# Patient Record
Sex: FEMALE | Race: WHITE | NOT HISPANIC OR LATINO | Employment: FULL TIME | URBAN - METROPOLITAN AREA
[De-identification: names, ages, dates, MRNs, and addresses within clinical notes are randomized per-mention and may not be internally consistent; named-entity substitution may affect disease eponyms.]

---

## 2017-02-03 ENCOUNTER — OFFICE VISIT (OUTPATIENT)
Dept: URGENT CARE | Facility: URGENT CARE | Age: 62
End: 2017-02-03
Payer: COMMERCIAL

## 2017-02-03 ENCOUNTER — RADIANT APPOINTMENT (OUTPATIENT)
Dept: GENERAL RADIOLOGY | Facility: CLINIC | Age: 62
End: 2017-02-03
Attending: FAMILY MEDICINE
Payer: COMMERCIAL

## 2017-02-03 VITALS
BODY MASS INDEX: 21.63 KG/M2 | RESPIRATION RATE: 16 BRPM | WEIGHT: 130 LBS | HEART RATE: 78 BPM | SYSTOLIC BLOOD PRESSURE: 126 MMHG | DIASTOLIC BLOOD PRESSURE: 84 MMHG | TEMPERATURE: 97.8 F | OXYGEN SATURATION: 98 %

## 2017-02-03 DIAGNOSIS — M25.531 RIGHT WRIST PAIN: Primary | ICD-10-CM

## 2017-02-03 DIAGNOSIS — S69.91XA RIGHT WRIST INJURY: ICD-10-CM

## 2017-02-03 PROCEDURE — 73110 X-RAY EXAM OF WRIST: CPT | Mod: RT

## 2017-02-03 PROCEDURE — 99213 OFFICE O/P EST LOW 20 MIN: CPT | Performed by: FAMILY MEDICINE

## 2017-02-03 RX ORDER — ONDANSETRON 4 MG/1
4-8 TABLET, ORALLY DISINTEGRATING ORAL EVERY 8 HOURS PRN
Qty: 20 TABLET | Refills: 1 | Status: SHIPPED | OUTPATIENT
Start: 2017-02-03 | End: 2017-02-08

## 2017-02-03 NOTE — MR AVS SNAPSHOT
"              After Visit Summary   2/3/2017    Delilah Lopes    MRN: [id number]           Patient Information     Date Of Birth          1955        Visit Information        Provider Department      2/3/2017 8:30 PM Lee Brito MD [facility]        Today's Diagnoses     Right wrist pain    -  1       Follow-ups after your visit        Who to contact     If you have questions or need follow up information about today's clinic visit or your schedule please contact [facility] directly at [phone].  Normal or non-critical lab and imaging results will be communicated to you by [employer], letter or phone within 4 business days after the clinic has received the results. If you do not hear from us within 7 days, please contact the clinic through [place] or phone. If you have a critical or abnormal lab result, we will notify you by phone as soon as possible.  Submit refill requests through [employer] or call your pharmacy and they will forward the refill request to us. Please allow 3 business days for your refill to be completed.          Additional Information About Your Visit        MyChart Information     [employer] lets you send messages to your doctor, view your test results, renew your prescriptions, schedule appointments and more. To sign up, go to [email or web address] . Click on \"Log in\" on the left side of the screen, which will take you to the Welcome page. Then click on \"Sign up Now\" on the right side of the page.     You will be asked to enter the access code listed below, as well as some personal information. Please follow the directions to create your username and password.     Your access code is: [email or web address]  Expires: 5/15/2017  6:59 PM     Your access code will  in 90 days. If you need help or a new code, please call your [place] clinic or [phone].        Care EveryWhere ID     This is your Care EveryWhere ID. This could be used by other " organizations to access your [place] medical records  [id number]        Your Vitals Were     Pulse Temperature Respirations Last Period Pulse Oximetry BMI (Body Mass Index)    78 97.8  F (36.6  C) (Oral) 16 02/12/2006 98% 21.63 kg/m2       Blood Pressure from Last 3 Encounters:   02/08/17 110/70   02/03/17 126/84   12/22/14 112/76    Weight from Last 3 Encounters:   02/08/17 134 lb 8 oz (61 kg)   02/03/17 130 lb (59 kg)   12/22/14 130 lb (59 kg)                 Today's Medication Changes          These changes are accurate as of: 2/3/17 11:59 PM.  If you have any questions, ask your nurse or doctor.               Start taking these medicines.        Dose/Directions    acetaminophen-codeine 300-30 MG per tablet   Commonly known as:  TYLENOL #3   Used for:  Right wrist pain   Started by:  Lee Brito MD        Dose:  1 tablet   Take 1 tablet by mouth every 4 hours as needed for pain maximum 3 tablet(s) per day   Quantity:  15 tablet   Refills:  0       ondansetron 4 MG ODT tab   Commonly known as:  ZOFRAN ODT   Used for:  Right wrist pain   Started by:  Lee Brito MD        Dose:  4-8 mg   Take 1-2 tablets (4-8 mg) by mouth every 8 hours as needed for nausea   Quantity:  20 tablet   Refills:  1            Where to get your medicines      These medications were sent to [facility] [address] [place] [address]  [id number] [place] [id number]    Hours:  24-hours Phone:  [phone]     ondansetron 4 MG ODT tab         Some of these will need a paper prescription and others can be bought over the counter.  Ask your nurse if you have questions.     Bring a paper prescription for each of these medications     acetaminophen-codeine 300-30 MG per tablet                Primary Care Provider Office Phone # Fax #    Donald Garcia [phone]       [facility] [address] [place] [address]         Thank you!     Thank you for choosing [facility]  for your care. Our goal is always to provide you with excellent care. Hearing back from our patients is one way we can continue to improve our services. Please take a few minutes to complete the written survey that you may receive in the mail after your visit with us. Thank you!             Your Updated Medication List - Protect others around you: Learn how to safely use, store and throw away your medicines at www.disposemymeds.org.          This list is accurate as of: 2/3/17 11:59 PM.  Always use your most recent med list.                   Brand Name Dispense Instructions for use    acetaminophen-codeine 300-30 MG per tablet    TYLENOL #3    15 tablet    Take 1 tablet by mouth every 4 hours as needed for pain maximum 3 tablet(s) per day       albuterol 108 (90 BASE) MCG/ACT Inhaler    albuterol    1 Inhaler    Inhale 2 puffs into the lungs every 6 hours as needed for shortness of breath / dyspnea or wheezing       B COMPLEX PO      None Entered       Multi-vitamin Tabs tablet   Generic drug:  multivitamin, therapeutic with minerals      1 TABLET DAILY       ondansetron 4 MG ODT tab    ZOFRAN ODT    20 tablet    Take 1-2 tablets (4-8 mg) by mouth every 8 hours as needed for nausea

## 2017-02-04 NOTE — NURSING NOTE
"Chief Complaint   Patient presents with     Urgent Care     Fall       Initial /84 mmHg  Pulse 78  Temp(Src) 97.8  F (36.6  C) (Oral)  Resp 16  Wt 130 lb (58.968 kg)  SpO2 98%  LMP 02/12/2006 Estimated body mass index is 21.63 kg/(m^2) as calculated from the following:    Height as of 12/22/14: 5' 5\" (1.651 m).    Weight as of this encounter: 130 lb (58.968 kg).  Medication Reconciliation: complete     Deb Tinsley  CMA      "

## 2017-02-04 NOTE — PROGRESS NOTES
SUBJECTIVE:                                                    Delilah Lopes is a 61 year old female who presents to clinic today for the following health issues:      Fell on ice today right wrist fell around 30 minutes.  She thinks she may have broken her wrist.  No nausea or Vomiting.          OBJECTIVE:  Vital signs as noted IMPRESSION: Distal radius fracture, minimally displaced, no definite  intra-articular component. No ulnar fracture. No definite carpal  fracture.above.  Appearance: moderately ill.  Wrist exam: soft tissue tenderness and swelling at the wrist. There does not appear to be angulation but certainly she is unable to move it  Secondary to pain. Distal neurovascular is normal.  X-ray: fracture of Distal radius minimally displaced.    ASSESSMENT:  wrist fracture    PLAN:  NSAID, ice suggested  In addition we did take her to the procedure room. There she was placed in a splint. We also did place her in a sling.    Since there is minimal displacement I think she can certainly wait until Monday to be seen by orthopedics however  She can be seen tomorrow at the orthopedic urgent care.    Current Outpatient Prescriptions   Medication Sig Dispense Refill     acetaminophen-codeine (TYLENOL #3) 300-30 MG per tablet Take 1 tablet by mouth every 4 hours as needed for pain maximum 3 tablet(s) per day 15 tablet 0     albuterol (ALBUTEROL) 108 (90 BASE) MCG/ACT inhaler Inhale 2 puffs into the lungs every 6 hours as needed for shortness of breath / dyspnea or wheezing 1 Inhaler 1     MULTI-VITAMIN OR TABS 1 TABLET DAILY       B COMPLEX OR None Entered       multivitamin, therapeutic with minerals (MULTI-VITAMIN) TABS tablet        Ascorbic Acid (VITAMIN C) 100 MG TABS        acetaminophen-codeine (TYLENOL #3) 300-30 MG per tablet        hydrochlorothiazide (HYDRODIURIL) 25 MG tablet Take 25 mg by mouth       ASMANEX 30 METERED DOSES 110 MCG/INH inhaler INHALE ONE PUFF DAILY FOR ASTHMA, USE WHEN OUT OF  TOWN AND NEBULIZER NOT AVAILABLE  3       See orders in [facility].

## 2017-02-08 ENCOUNTER — OFFICE VISIT (OUTPATIENT)
Dept: FAMILY MEDICINE | Facility: CLINIC | Age: 62
End: 2017-02-08
Payer: COMMERCIAL

## 2017-02-08 VITALS
HEART RATE: 84 BPM | HEIGHT: 65 IN | TEMPERATURE: 97.8 F | SYSTOLIC BLOOD PRESSURE: 110 MMHG | RESPIRATION RATE: 14 BRPM | OXYGEN SATURATION: 97 % | DIASTOLIC BLOOD PRESSURE: 70 MMHG | WEIGHT: 134.5 LBS | BODY MASS INDEX: 22.41 KG/M2

## 2017-02-08 DIAGNOSIS — Z01.818 PREOP GENERAL PHYSICAL EXAM: Primary | ICD-10-CM

## 2017-02-08 LAB
ALBUMIN SERPL-MCNC: 4.1 G/DL (ref 3.4–5)
ALP SERPL-CCNC: 71 U/L (ref 40–150)
ALT SERPL W P-5'-P-CCNC: 20 U/L (ref 0–50)
ANION GAP SERPL CALCULATED.3IONS-SCNC: 8 MMOL/L (ref 3–14)
AST SERPL W P-5'-P-CCNC: 10 U/L (ref 0–45)
BILIRUB SERPL-MCNC: 1.1 MG/DL (ref 0.2–1.3)
BUN SERPL-MCNC: 12 MG/DL (ref 7–30)
CALCIUM SERPL-MCNC: 9.3 MG/DL (ref 8.5–10.1)
CHLORIDE SERPL-SCNC: 98 MMOL/L (ref 94–109)
CO2 SERPL-SCNC: 31 MMOL/L (ref 20–32)
CREAT SERPL-MCNC: 0.66 MG/DL (ref 0.52–1.04)
ERYTHROCYTE [DISTWIDTH] IN BLOOD BY AUTOMATED COUNT: 12 % (ref 10–15)
GFR SERPL CREATININE-BSD FRML MDRD: ABNORMAL ML/MIN/1.7M2
GLUCOSE SERPL-MCNC: 91 MG/DL (ref 70–99)
HCT VFR BLD AUTO: 39 % (ref 35–47)
HGB BLD-MCNC: 13.1 G/DL (ref 11.7–15.7)
MCH RBC QN AUTO: 31.4 PG (ref 26.5–33)
MCHC RBC AUTO-ENTMCNC: 33.6 G/DL (ref 31.5–36.5)
MCV RBC AUTO: 94 FL (ref 78–100)
PLATELET # BLD AUTO: 313 10E9/L (ref 150–450)
POTASSIUM SERPL-SCNC: 3.2 MMOL/L (ref 3.4–5.3)
PROT SERPL-MCNC: 7.6 G/DL (ref 6.8–8.8)
RBC # BLD AUTO: 4.17 10E12/L (ref 3.8–5.2)
SODIUM SERPL-SCNC: 137 MMOL/L (ref 133–144)
WBC # BLD AUTO: 6.2 10E9/L (ref 4–11)

## 2017-02-08 PROCEDURE — 80053 COMPREHEN METABOLIC PANEL: CPT | Performed by: NURSE PRACTITIONER

## 2017-02-08 PROCEDURE — 99214 OFFICE O/P EST MOD 30 MIN: CPT | Mod: 25 | Performed by: NURSE PRACTITIONER

## 2017-02-08 PROCEDURE — 85027 COMPLETE CBC AUTOMATED: CPT | Performed by: NURSE PRACTITIONER

## 2017-02-08 PROCEDURE — 36415 COLL VENOUS BLD VENIPUNCTURE: CPT | Performed by: NURSE PRACTITIONER

## 2017-02-08 RX ORDER — MULTIPLE VITAMINS W/ MINERALS TAB 9MG-400MCG
TAB ORAL
COMMUNITY
End: 2017-07-09

## 2017-02-08 RX ORDER — RIBOFLAVIN (VITAMIN B2) 100 MG
1500 TABLET ORAL AT BEDTIME
COMMUNITY
Start: 2007-06-19

## 2017-02-08 RX ORDER — HYDROCHLOROTHIAZIDE 25 MG/1
25 TABLET ORAL DAILY
COMMUNITY
Start: 2016-05-05 | End: 2017-10-03 | Stop reason: SINTOL

## 2017-02-08 NOTE — PROGRESS NOTES
[facility]  [address] [place] [address]  [phone]  Dept: [phone]    PRE-OP EVALUATION:  Today's date: 2017    Delilah Lopes (: 1955) presents for pre-operative evaluation assessment as requested by Dr. Evelin Andrade.  She requires evaluation and anesthesia risk assessment prior to undergoing surgery/procedure for treatment of Radial distal fracture  .  Proposed procedure: fracture    Date of Surgery/ Procedure: 2017  Time of Surgery/ Procedure: 12  Hospital/Surgical Facility: [facility]  Fax number for surgical facility: [phone]  Primary Physician: Donald Garcia  Type of Anesthesia Anticipated: General    Patient has a Health Care Directive or Living Will:  YES we don't have a copy    1. NO - Do you have a history of heart attack, stroke, stent, bypass or surgery on an artery in the head, neck, heart or legs?  2. NO - Do you ever have any pain or discomfort in your chest?  3. NO - Do you have a history of  Heart Failure?  4. NO - Are you troubled by shortness of breath when: walking on the level, up a slight hill or at night?  5. NO - Do you currently have a cold, bronchitis or other respiratory infection?  6. NO - Do you have a cough, shortness of breath or wheezing?  7. NO - Do you sometimes get pains in the calves of your legs when you walk?  8. YES - Do you or anyone in your family have previous history of blood clots?  9. NO - Do you or does anyone in your family have a serious bleeding problem such as prolonged bleeding following surgeries or cuts?  10. NO - Have you ever had problems with anemia or been told to take iron pills?  11. NO - Have you had any abnormal blood loss such as black, tarry or bloody stools, or abnormal vaginal bleeding?  12. NO - Have you ever had a blood transfusion?  13. YES - Have you or any of your relatives ever had problems with anesthesia? Postop nausea  14. NO - Do you have sleep apnea, excessive snoring or  daytime drowsiness?  15. NO - Do you have any prosthetic heart valves?  16. NO - Do you have prosthetic joints?  17. NO - Is there any chance that you may be pregnant?      HPI:                                                      Brief HPI related to upcoming procedure:  Patient fell on the icy pavement and has a right distal radial fracture that needs to be surgically repaired.           MEDICAL HISTORY:                                                      Patient Active Problem List    Diagnosis Date Noted     Essential hypertension 2015     Priority: Medium     CARDIOVASCULAR SCREENING; LDL GOAL LESS THAN 160 10/31/2010     Cold sore 2009     Mild persistent asthma 2006     Stricture and stenosis of esophagus 2002     Urethral stricture 2002     Problem list name updated by automated process. Provider to review        Past Medical History   Diagnosis Date     Stricture and stenosis of esophagus      Dx , [place] [facility]     Urethral stricture unspecified      Mild persistent asthma      HTN (hypertension)      Essential hypertension 2015     Past Surgical History   Procedure Laterality Date     C nonspecific procedure        x3 ,,     Hysterectomy, pap no longer indicated       Stent       kidney     Hernia repair       2 hernia repairs     Current Outpatient Prescriptions   Medication Sig Dispense Refill     acetaminophen-codeine (TYLENOL #3) 300-30 MG per tablet Take 1 tablet by mouth every 4 hours as needed for pain maximum 3 tablet(s) per day 15 tablet 0     ondansetron (ZOFRAN ODT) 4 MG ODT tab Take 1-2 tablets (4-8 mg) by mouth every 8 hours as needed for nausea 20 tablet 1     albuterol (ALBUTEROL) 108 (90 BASE) MCG/ACT inhaler Inhale 2 puffs into the lungs every 6 hours as needed for shortness of breath / dyspnea or wheezing 1 Inhaler 1     azithromycin (ZITHROMAX) 250 MG tablet Two tablets first day, then one tablet daily for four days. 6  tablet 0     atenolol (TENORMIN) 50 MG tablet Take 1 tablet (50 mg) by mouth daily 90 tablet 4     MULTI-VITAMIN OR TABS 1 TABLET DAILY       B COMPLEX OR None Entered       OTC products: None, except as noted above    Allergies   Allergen Reactions     Cat Hair [Cats]      Sulfa Drugs Rash     Ultram [Tramadol Hcl]      Headache  Weirdness  And sick nasuea, vomiting      Latex Allergy: NO    Social History   Substance Use Topics     Smoking status: Never Smoker      Smokeless tobacco: Never Used     Alcohol Use: Yes      Comment: 2-3 times a week     History   Drug Use No       REVIEW OF SYSTEMS:                                                    Constitutional, HEENT, cardiovascular, pulmonary, gi and gu systems are negative, except as otherwise noted.    EXAM:                                                    [facility] 02/12/2006    GENERAL APPEARANCE: healthy, alert and no distress     EYES: EOMI,- PERRL, slight irritation noted of conjunctiva     HENT: ear canals and TM's normal and nose and mouth without ulcers or lesions     NECK: no adenopathy, no asymmetry, masses, or scars and thyroid normal to palpation     RESP: lungs clear to auscultation - no rales, rhonchi or wheezes     CV: regular rates and rhythm, normal S1 S2, no S3 or S4 and no murmur, click or rub -     ABDOMEN:  soft, nontender, no HSM or masses and bowel sounds normal     MS: right forearm in a soft splint with good CMS. FROM in all extremities.     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 and affect normal/bright     LYMPHATICS: No axillary, cervical, inguinal, or supraclavicular nodes    DIAGNOSTICS:                                                    Coagulation Studies (e.g. INR, PTT, platelet count)    Recent Labs   Lab Test  12/19/14 0828 02/25/14   NA  140   --     POTASSIUM  4.7  4.7   CR  0.83  0.8        IMPRESSION:                                                    Diagnosis/reason for consult: right distal radius fracture.     The proposed surgical procedure is considered INTERMEDIATE risk.    REVISED CARDIAC RISK INDEX  The patient has the following serious cardiovascular risks for perioperative complications such as (MI, PE, VFib and 3  AV Block):  No serious cardiac risks  INTERPRETATION: 1 risks: Class II (low risk - 0.9% complication rate)    The patient has the following additional risks for perioperative complications:  No identified additional risks        RECOMMENDATIONS:                                                          --Patient is to take all scheduled medications on the day of surgery EXCEPT for modifications listed below.    APPROVAL GIVEN to proceed with proposed procedure, without further diagnostic evaluation       Signed Electronically by: Nelly Hamilton NP    Copy of this evaluation report is provided to requesting physician.    [facility] Preop Guidelines

## 2017-02-08 NOTE — NURSING NOTE
"Chief Complaint   Patient presents with     Pre-Op Exam       Initial /70 mmHg  Pulse 84  Temp(Src) 97.8  F (36.6  C) (Oral)  Resp 14  Ht 5' 5\" (1.651 m)  Wt 134 lb 8 oz (61.009 kg)  BMI 22.38 kg/m2  SpO2 97%  LMP 02/12/2006  Breastfeeding? No Estimated body mass index is 22.38 kg/(m^2) as calculated from the following:    Height as of this encounter: 5' 5\" (1.651 m).    Weight as of this encounter: 134 lb 8 oz (61.009 kg).  Medication Reconciliation: unable or not appropriate to perform   PERLITA Nayak      "

## 2017-02-09 ASSESSMENT — ASTHMA QUESTIONNAIRES: ACT_TOTALSCORE: 24

## 2017-05-23 ENCOUNTER — TELEPHONE (OUTPATIENT)
Dept: FAMILY MEDICINE | Facility: CLINIC | Age: 62
End: 2017-05-23

## 2017-05-23 NOTE — LETTER
[facility]          [address] [place] [address] [phone]      Delilah Lopes  [address]  [facility] [address]      Dear Delilah,    Our records indicate that you may be due for preventative health care services.  Please make an appointment or call to set up the following tests as recommended.  If you have already had this testing done at another clinic please contact us to help us update our records to reflect the preventative care that you have had.    Breast cancer screen (mammogram) - Recommended every 1-2 years for women age 50 and older. Mammograms help detect breast cancer, which is the most common cancer among women in the [place]. You may need to start having mammograms earlier and more often if you have had breast cancer, breast problems, or have a family history of breast cancer.                                                                                                                                    Thank you for choosing [facility]. We appreciate the opportunity to serve you and look forward to supporting your healthcare needs in the future.    If you have any questions or concerns, please contact us at [phone]      Sincerely,      [facility] Staff

## 2017-05-23 NOTE — TELEPHONE ENCOUNTER
Panel Management Review      Patient has the following on her problem list:     Asthma review     ACT Total Scores 2/8/2017   ACT TOTAL SCORE (Goal Greater than or Equal to 20) 24   In the past 12 months, how many times did you visit the emergency room for your asthma without being admitted to the hospital? 0   In the past 12 months, how many times were you hospitalized overnight because of your asthma? 0      1. Is Asthma diagnosis on the Problem List? Yes    2. Is Asthma listed on Health Maintenance? Yes    3. Patient is due for:  AAP      Composite cancer screening  Chart review shows that this patient is due/due soon for the following Mammogram  Summary:    Patient is due/failing the following:   MAMMOGRAM    Action needed:   Patient needs referral/order: mammogram     Type of outreach:    Phone, left message for patient to call back.     Questions for provider review:    None                                                                                                                                    PERLITA Nayak       Chart routed to  .

## 2017-07-09 ENCOUNTER — APPOINTMENT (OUTPATIENT)
Dept: GENERAL RADIOLOGY | Facility: CLINIC | Age: 62
End: 2017-07-09
Attending: EMERGENCY MEDICINE
Payer: COMMERCIAL

## 2017-07-09 ENCOUNTER — HOSPITAL ENCOUNTER (OUTPATIENT)
Facility: CLINIC | Age: 62
Setting detail: OBSERVATION
Discharge: HOME | End: 2017-07-10
Attending: EMERGENCY MEDICINE | Admitting: INTERNAL MEDICINE
Payer: COMMERCIAL

## 2017-07-09 DIAGNOSIS — R07.9 ACUTE CHEST PAIN: ICD-10-CM

## 2017-07-09 LAB
ALBUMIN SERPL-MCNC: 4 G/DL (ref 3.4–5)
ALP SERPL-CCNC: 104 U/L (ref 40–150)
ALT SERPL W P-5'-P-CCNC: 26 U/L (ref 0–50)
ANION GAP SERPL CALCULATED.3IONS-SCNC: 7 MMOL/L (ref 3–14)
AST SERPL W P-5'-P-CCNC: 21 U/L (ref 0–45)
BASOPHILS # BLD AUTO: 0.1 10E9/L (ref 0–0.2)
BASOPHILS NFR BLD AUTO: 0.8 %
BILIRUB SERPL-MCNC: 0.4 MG/DL (ref 0.2–1.3)
BUN SERPL-MCNC: 11 MG/DL (ref 7–30)
CALCIUM SERPL-MCNC: 8.8 MG/DL (ref 8.5–10.1)
CHLORIDE SERPL-SCNC: 104 MMOL/L (ref 94–109)
CO2 SERPL-SCNC: 30 MMOL/L (ref 20–32)
CREAT SERPL-MCNC: 0.71 MG/DL (ref 0.52–1.04)
DIFFERENTIAL METHOD BLD: NORMAL
EOSINOPHIL # BLD AUTO: 0.5 10E9/L (ref 0–0.7)
EOSINOPHIL NFR BLD AUTO: 5.1 %
ERYTHROCYTE [DISTWIDTH] IN BLOOD BY AUTOMATED COUNT: 11.9 % (ref 10–15)
GFR SERPL CREATININE-BSD FRML MDRD: 84 ML/MIN/1.7M2
GLUCOSE SERPL-MCNC: 95 MG/DL (ref 70–99)
HCT VFR BLD AUTO: 37.9 % (ref 35–47)
HGB BLD-MCNC: 12.8 G/DL (ref 11.7–15.7)
IMM GRANULOCYTES # BLD: 0 10E9/L (ref 0–0.4)
IMM GRANULOCYTES NFR BLD: 0.1 %
LYMPHOCYTES # BLD AUTO: 2.6 10E9/L (ref 0.8–5.3)
LYMPHOCYTES NFR BLD AUTO: 26.4 %
MCH RBC QN AUTO: 31.4 PG (ref 26.5–33)
MCHC RBC AUTO-ENTMCNC: 33.8 G/DL (ref 31.5–36.5)
MCV RBC AUTO: 93 FL (ref 78–100)
MONOCYTES # BLD AUTO: 0.8 10E9/L (ref 0–1.3)
MONOCYTES NFR BLD AUTO: 7.9 %
NEUTROPHILS # BLD AUTO: 5.9 10E9/L (ref 1.6–8.3)
NEUTROPHILS NFR BLD AUTO: 59.7 %
NRBC # BLD AUTO: 0 10*3/UL
NRBC BLD AUTO-RTO: 0 /100
PLATELET # BLD AUTO: 312 10E9/L (ref 150–450)
POTASSIUM SERPL-SCNC: 3.1 MMOL/L (ref 3.4–5.3)
PROT SERPL-MCNC: 7.6 G/DL (ref 6.8–8.8)
RBC # BLD AUTO: 4.07 10E12/L (ref 3.8–5.2)
SODIUM SERPL-SCNC: 141 MMOL/L (ref 133–144)
TROPONIN I BLD-MCNC: 0 UG/L (ref 0–0.1)
TROPONIN I SERPL-MCNC: NORMAL UG/L (ref 0–0.04)
WBC # BLD AUTO: 9.9 10E9/L (ref 4–11)

## 2017-07-09 PROCEDURE — 25000132 ZZH RX MED GY IP 250 OP 250 PS 637: Performed by: EMERGENCY MEDICINE

## 2017-07-09 PROCEDURE — 85025 COMPLETE CBC W/AUTO DIFF WBC: CPT | Performed by: EMERGENCY MEDICINE

## 2017-07-09 PROCEDURE — G0378 HOSPITAL OBSERVATION PER HR: HCPCS

## 2017-07-09 PROCEDURE — 99285 EMERGENCY DEPT VISIT HI MDM: CPT | Mod: 25

## 2017-07-09 PROCEDURE — 84484 ASSAY OF TROPONIN QUANT: CPT

## 2017-07-09 PROCEDURE — 84484 ASSAY OF TROPONIN QUANT: CPT | Performed by: EMERGENCY MEDICINE

## 2017-07-09 PROCEDURE — 71020 XR CHEST 2 VW: CPT

## 2017-07-09 PROCEDURE — 93005 ELECTROCARDIOGRAM TRACING: CPT

## 2017-07-09 PROCEDURE — 99220 ZZC INITIAL OBSERVATION CARE,LEVL III: CPT | Performed by: INTERNAL MEDICINE

## 2017-07-09 PROCEDURE — 80053 COMPREHEN METABOLIC PANEL: CPT | Performed by: EMERGENCY MEDICINE

## 2017-07-09 RX ORDER — POTASSIUM CHLORIDE 1.5 G/1.58G
40 POWDER, FOR SOLUTION ORAL ONCE
Status: COMPLETED | OUTPATIENT
Start: 2017-07-09 | End: 2017-07-10

## 2017-07-09 RX ORDER — NITROGLYCERIN 0.4 MG/1
0.4 TABLET SUBLINGUAL EVERY 5 MIN PRN
Status: DISCONTINUED | OUTPATIENT
Start: 2017-07-09 | End: 2017-07-10

## 2017-07-09 RX ORDER — ASPIRIN 325 MG
325 TABLET ORAL ONCE
Status: COMPLETED | OUTPATIENT
Start: 2017-07-09 | End: 2017-07-09

## 2017-07-09 RX ADMIN — ASPIRIN 325 MG ORAL TABLET 325 MG: 325 PILL ORAL at 21:51

## 2017-07-09 RX ADMIN — NITROGLYCERIN 0.4 MG: 0.4 TABLET SUBLINGUAL at 21:51

## 2017-07-09 ASSESSMENT — ENCOUNTER SYMPTOMS
VOMITING: 0
NAUSEA: 0
DIAPHORESIS: 0

## 2017-07-09 NOTE — IP AVS SNAPSHOT
[facility] Observation Department    [address]    [facility] [address]    Phone:  [phone]                                       After Visit Summary   7/9/2017    Delilah Lopes    MRN: [id number]           After Visit Summary Signature Page     I have received my discharge instructions, and my questions have been answered. I have discussed any challenges I see with this plan with the nurse or doctor.    ..........................................................................................................................................  Patient/Patient Representative Signature      ..........................................................................................................................................  Patient Representative Print Name and Relationship to Patient    ..................................................               ................................................  Date                                            Time    ..........................................................................................................................................  Reviewed by Signature/Title    ...................................................              ..............................................  Date                                                            Time

## 2017-07-09 NOTE — IP AVS SNAPSHOT
MRN:[id number]                      After Visit Summary   7/9/2017    Delilah Lopes    MRN: [id number]           Thank you!     Thank you for choosing [facility] for your care. Our goal is always to provide you with excellent care. Hearing back from our patients is one way we can continue to improve our services. Please take a few minutes to complete the written survey that you may receive in the mail after you visit. If you would like to speak to someone directly about your visit please contact Patient Relations at [phone]. Thank you!          Patient Information     Date Of Birth          1955        About your hospital stay     You were admitted on:  July 9, 2017 You last received care in the:  [facility] Observation Department    You were discharged on:  July 10, 2017        Reason for your hospital stay       You were admitted due to concerns of chest pain. Your workup included monitoring on telemetry, serial troponins which were negative, and exercise stress test that was normal.                  Who to Call     For medical emergencies, please call 911.  For non-urgent questions about your medical care, please call your primary care provider or clinic, [phone]          Attending Provider     Provider Specialty    Alber Samaniego MD Emergency Medicine    Vipin Wynne MD Internal Medicine       Primary Care Provider Office Phone #    [facility] [phone]      After Care Instructions     Activity       Your activity upon discharge: activity as tolerated            Diet       Follow this diet upon discharge: Regular diet            Discharge Instructions       1. Keep daily log of blood pressures to bring to follow up appointment with primary care to discuss possible changes to current medication regimen  2. Monitor recurrence of symptoms with certain foods or activity (consider acid reflux as cause to pain prior to  "admission) and discuss with primary care                  Follow-up Appointments     Follow-up and recommended labs and tests        Follow up with primary care provider, [facility], within 7 days for hospital follow- up and discuss blood pressure management.  No follow up labs or test are needed.                             Pending Results     No orders found for last 3 day(s).            Statement of Approval     Ordered          07/10/17 1101  I have reviewed and agree with all the recommendations and orders detailed in this document.  EFFECTIVE NOW     Approved and electronically signed by:  hCerie Duffy PA-C             Admission Information     Date & Time Provider Department Dept. Phone    2017 Vipin Wynne MD [facility] Observation Department [phone]      Your Vitals Were     Blood Pressure Pulse Temperature Respirations Height Weight    138/84 (BP Location: Right arm) 74 97  F (36.1  C) (Oral) 16 1.651 m (5' 5\") 62 kg (136 lb 9.6 oz)    Last Period Pulse Oximetry BMI (Body Mass Index)             2006 95% 22.73 kg/m2         [employer] Information     [employer] lets you send messages to your doctor, view your test results, renew your prescriptions, schedule appointments and more. To sign up, go to [email or web address] . Click on \"Log in\" on the left side of the screen, which will take you to the Welcome page. Then click on \"Sign up Now\" on the right side of the page.     You will be asked to enter the access code listed below, as well as some personal information. Please follow the directions to create your username and password.     Your access code is: [email or web address]  Expires: 10/8/2017 11:07 AM     Your access code will  in 90 days. If you need help or a new code, please call your [facility] clinic or [phone].        Care EveryWhere ID     This is your Care EveryWhere ID. This could be used by other organizations to access your [facility] " medical records  [id number]        Equal Access to Services     RAF SHAYE : Hadii aad ku hadrayamisael Gardner, wasohada lujaya, qatonyta shakirakaitlynnnadia smith, kellie marcusmaximusbeltran murray. So [facility] [phone].    ATENCIÓN: Si habla español, tiene a chakraborty disposición servicios gratuitos de asistencia lingüística. Llame al [phone].    We comply with applicable federal civil rights laws and [place] laws. We do not discriminate on the basis of race, color, national origin, age, disability sex, sexual orientation or gender identity.               Review of your medicines      CONTINUE these medicines which have NOT CHANGED        Dose / Directions    albuterol 108 (90 BASE) MCG/ACT Inhaler   Commonly known as:  albuterol   Used for:  Mild persistent asthma        Dose:  2 puff   Inhale 2 puffs into the lungs every 6 hours as needed for shortness of breath / dyspnea or wheezing   Quantity:  1 Inhaler   Refills:  1       ASMANEX 30 METERED DOSES 110 MCG/INH inhaler   Generic drug:  mometasone        INHALE ONE PUFF DAILY FOR ASTHMA, USE WHEN OUT OF TOWN AND NEBULIZER NOT AVAILABLE   Refills:  3       B COMPLEX PO        1 tablet orally at bedtime   Refills:  0       FISH OIL PO        Dose:  2 capsule   Take 2 capsules by mouth At Bedtime   Refills:  0       hydrochlorothiazide 25 MG tablet   Commonly known as:  HYDRODIURIL        Dose:  25 mg   Take 25 mg by mouth daily   Refills:  0       Multi-vitamin Tabs tablet   Generic drug:  multivitamin, therapeutic with minerals        1 TABLET DAILY at bedtime   Refills:  0       Vitamin C 100 MG Tabs        Dose:  1500 mg   Take 1,500 mg by mouth At Bedtime   Refills:  0       VITAMIN D (CHOLECALCIFEROL) PO        Dose:  2500 Units   Take 2,500 Units by mouth At Bedtime   Refills:  0                Protect others around you: Learn how to safely use, store and throw away your medicines at www.disposemymeds.org.             Medication List: This is a list of all  your medications and when to take them. Check marks below indicate your daily home schedule. Keep this list as a reference.      Medications           Morning Afternoon Evening Bedtime As Needed    albuterol 108 (90 BASE) MCG/ACT Inhaler   Commonly known as:  albuterol   Inhale 2 puffs into the lungs every 6 hours as needed for shortness of breath / dyspnea or wheezing                                ASMANEX 30 METERED DOSES 110 MCG/INH inhaler   INHALE ONE PUFF DAILY FOR ASTHMA, USE WHEN OUT OF TOWN AND NEBULIZER NOT AVAILABLE   Generic drug:  mometasone                                B COMPLEX PO   1 tablet orally at bedtime                                FISH OIL PO   Take 2 capsules by mouth At Bedtime                                hydrochlorothiazide 25 MG tablet   Commonly known as:  HYDRODIURIL   Take 25 mg by mouth daily   Last time this was given:  25 mg on 7/10/2017  8:35 AM                                Multi-vitamin Tabs tablet   1 TABLET DAILY at bedtime   Generic drug:  multivitamin, therapeutic with minerals                                Vitamin C 100 MG Tabs   Take 1,500 mg by mouth At Bedtime                                VITAMIN D (CHOLECALCIFEROL) PO   Take 2,500 Units by mouth At Bedtime

## 2017-07-10 ENCOUNTER — APPOINTMENT (OUTPATIENT)
Dept: CARDIOLOGY | Facility: CLINIC | Age: 62
End: 2017-07-10
Attending: INTERNAL MEDICINE
Payer: COMMERCIAL

## 2017-07-10 VITALS
SYSTOLIC BLOOD PRESSURE: 138 MMHG | WEIGHT: 136.6 LBS | OXYGEN SATURATION: 95 % | BODY MASS INDEX: 22.76 KG/M2 | HEIGHT: 65 IN | DIASTOLIC BLOOD PRESSURE: 84 MMHG | HEART RATE: 74 BPM | RESPIRATION RATE: 16 BRPM | TEMPERATURE: 97 F

## 2017-07-10 PROBLEM — R07.9 CHEST PAIN: Status: RESOLVED | Noted: 2017-07-10 | Resolved: 2017-07-10

## 2017-07-10 PROBLEM — R07.9 CHEST PAIN: Status: ACTIVE | Noted: 2017-07-10

## 2017-07-10 LAB
INTERPRETATION ECG - MUSE: NORMAL
POTASSIUM SERPL-SCNC: 3.7 MMOL/L (ref 3.4–5.3)
TROPONIN I SERPL-MCNC: NORMAL UG/L (ref 0–0.04)
TROPONIN I SERPL-MCNC: NORMAL UG/L (ref 0–0.04)

## 2017-07-10 PROCEDURE — 96374 THER/PROPH/DIAG INJ IV PUSH: CPT | Mod: 59

## 2017-07-10 PROCEDURE — 93325 DOPPLER ECHO COLOR FLOW MAPG: CPT | Mod: TC

## 2017-07-10 PROCEDURE — 25000132 ZZH RX MED GY IP 250 OP 250 PS 637: Performed by: PHYSICIAN ASSISTANT

## 2017-07-10 PROCEDURE — 99217 ZZC OBSERVATION CARE DISCHARGE: CPT | Performed by: PHYSICIAN ASSISTANT

## 2017-07-10 PROCEDURE — 84484 ASSAY OF TROPONIN QUANT: CPT | Performed by: INTERNAL MEDICINE

## 2017-07-10 PROCEDURE — 93350 STRESS TTE ONLY: CPT | Mod: 26 | Performed by: INTERNAL MEDICINE

## 2017-07-10 PROCEDURE — 25000132 ZZH RX MED GY IP 250 OP 250 PS 637: Performed by: EMERGENCY MEDICINE

## 2017-07-10 PROCEDURE — S0028 INJECTION, FAMOTIDINE, 20 MG: HCPCS | Performed by: INTERNAL MEDICINE

## 2017-07-10 PROCEDURE — 93016 CV STRESS TEST SUPVJ ONLY: CPT | Performed by: INTERNAL MEDICINE

## 2017-07-10 PROCEDURE — G0378 HOSPITAL OBSERVATION PER HR: HCPCS

## 2017-07-10 PROCEDURE — 36415 COLL VENOUS BLD VENIPUNCTURE: CPT | Performed by: INTERNAL MEDICINE

## 2017-07-10 PROCEDURE — 93325 DOPPLER ECHO COLOR FLOW MAPG: CPT | Mod: 26 | Performed by: INTERNAL MEDICINE

## 2017-07-10 PROCEDURE — 25000125 ZZHC RX 250: Performed by: INTERNAL MEDICINE

## 2017-07-10 PROCEDURE — 84132 ASSAY OF SERUM POTASSIUM: CPT | Performed by: INTERNAL MEDICINE

## 2017-07-10 PROCEDURE — 25000132 ZZH RX MED GY IP 250 OP 250 PS 637: Performed by: INTERNAL MEDICINE

## 2017-07-10 PROCEDURE — 93321 DOPPLER ECHO F-UP/LMTD STD: CPT | Mod: 26 | Performed by: INTERNAL MEDICINE

## 2017-07-10 PROCEDURE — 93018 CV STRESS TEST I&R ONLY: CPT | Performed by: INTERNAL MEDICINE

## 2017-07-10 RX ORDER — ONDANSETRON 4 MG/1
4 TABLET, ORALLY DISINTEGRATING ORAL EVERY 6 HOURS PRN
Status: DISCONTINUED | OUTPATIENT
Start: 2017-07-10 | End: 2017-07-10 | Stop reason: HOSPADM

## 2017-07-10 RX ORDER — NITROGLYCERIN 0.4 MG/1
0.4 TABLET SUBLINGUAL EVERY 5 MIN PRN
Status: DISCONTINUED | OUTPATIENT
Start: 2017-07-10 | End: 2017-07-10 | Stop reason: HOSPADM

## 2017-07-10 RX ORDER — ASPIRIN 81 MG/1
81 TABLET ORAL DAILY
Status: DISCONTINUED | OUTPATIENT
Start: 2017-07-10 | End: 2017-07-10 | Stop reason: HOSPADM

## 2017-07-10 RX ORDER — ACETAMINOPHEN 325 MG/1
650 TABLET ORAL EVERY 4 HOURS PRN
Status: DISCONTINUED | OUTPATIENT
Start: 2017-07-10 | End: 2017-07-10 | Stop reason: HOSPADM

## 2017-07-10 RX ORDER — HYDROCHLOROTHIAZIDE 25 MG/1
25 TABLET ORAL DAILY
Status: DISCONTINUED | OUTPATIENT
Start: 2017-07-10 | End: 2017-07-10 | Stop reason: HOSPADM

## 2017-07-10 RX ORDER — NALOXONE HYDROCHLORIDE 0.4 MG/ML
.1-.4 INJECTION, SOLUTION INTRAMUSCULAR; INTRAVENOUS; SUBCUTANEOUS
Status: DISCONTINUED | OUTPATIENT
Start: 2017-07-10 | End: 2017-07-10 | Stop reason: HOSPADM

## 2017-07-10 RX ORDER — ONDANSETRON 2 MG/ML
4 INJECTION INTRAMUSCULAR; INTRAVENOUS EVERY 6 HOURS PRN
Status: DISCONTINUED | OUTPATIENT
Start: 2017-07-10 | End: 2017-07-10 | Stop reason: HOSPADM

## 2017-07-10 RX ADMIN — POTASSIUM CHLORIDE 40 MEQ: 1.5 POWDER, FOR SOLUTION ORAL at 00:41

## 2017-07-10 RX ADMIN — ASPIRIN 81 MG: 81 TABLET, COATED ORAL at 08:35

## 2017-07-10 RX ADMIN — FAMOTIDINE 20 MG: 10 INJECTION, SOLUTION INTRAVENOUS at 00:42

## 2017-07-10 RX ADMIN — HYDROCHLOROTHIAZIDE 25 MG: 25 TABLET ORAL at 08:35

## 2017-07-10 NOTE — ED NOTES
Pain in left arm for several hours and then developed chest pain in the past hour.  Pt did report some shortness of breath over the past day but has hx.  Of asthma.

## 2017-07-10 NOTE — PLAN OF CARE
Problem: Discharge Planning  Goal: Discharge Planning (Adult, OB, Behavioral, Peds)  OBSERVATION patient END time: 1119

## 2017-07-10 NOTE — PLAN OF CARE
Problem: Discharge Planning  Goal: Discharge Planning (Adult, OB, Behavioral, Peds)  Outcome: Improving  PRIMARY DIAGNOSIS: CHEST PAIN  OUTPATIENT/OBSERVATION GOALS TO BE MET BEFORE DISCHARGE:  1. Ruled out acute coronary syndrome (negative or stable Troponin):  Neg x 1, next to be drawn at 0200 and 0600  2. Pain Status: Pain free.  3. Appropriate provocative testing performed: Yes  - Stress Test Procedure: Regular, scheduled for tomorrow  - Interpretation of cardiac rhythm per telemetry tech: SR 82     4. Cleared by Consultants (if applicable):No  5. Return to near baseline physical activity: Yes  Discharge Planner Nurse   Safe discharge environment identified: Yes  Barriers to discharge: Yes, exercise stress test in am, trop x 2       Entered by: Juarez Alves 07/10/2017 2:01 AM     Please review provider order for any additional goals.   Nurse to notify provider when observation goals have been met and patient is ready for discharge.  Pt A&O x4, VSS. Pt denies chest/back pain, SOB, dizziness. Pt on clear liquid diet. Pt received 40 mEq of oral potassium, K was originally 3.1 in ED. Pt was oriented to unit and room. Will continue to monitor.

## 2017-07-10 NOTE — PLAN OF CARE
Problem: Discharge Planning  Goal: Discharge Planning (Adult, OB, Behavioral, Peds)  ROOM # 203     Living Situation (if not independent, order SW consult): lives in a home with  and daughter  Facility name: N/A  : [facility] ()     Activity level at baseline: independent  Activity level on admit: independent         Patient registered to observation; given Patient Bill of Rights; given the opportunity to ask questions about observation status and their plan of care.  Patient has been oriented to the observation room, bathroom and call light is in place.     Discussed discharge goals and expectations with patient/family.

## 2017-07-10 NOTE — PLAN OF CARE
Problem: Discharge Planning  Goal: Discharge Planning (Adult, OB, Behavioral, Peds)  Outcome: Improving  PRIMARY DIAGNOSIS: CHEST PAIN  OUTPATIENT/OBSERVATION GOALS TO BE MET BEFORE DISCHARGE:  1. Ruled out acute coronary syndrome (negative or stable Troponin):  Neg x 3  2. Pain Status: Pain free.  3. Appropriate provocative testing performed: Yes  - Stress Test Procedure: Regular, done - results pending  - Interpretation of cardiac rhythm per telemetry tech:       4. Cleared by Consultants (if applicable): No - stress test pending  5. Return to near baseline physical activity: Yes  VSS. Pt denies chest pain and associated symptoms. Stress test completed, results pending. K+ 3.1 last night; 40meq given PO; recheck ordered. Moving independently. Remains NPO until stress test is resulted.   Discharge Planner Nurse   Safe discharge environment identified: Yes  Barriers to discharge: Yes, exersize stress test results pending       Entered by: Juarez Alves 07/10/2017 4:44 AM     Please review provider order for any additional goals.   Nurse to notify provider when observation goals have been met and patient is ready for discharge.

## 2017-07-10 NOTE — PHARMACY-ADMISSION MEDICATION HISTORY
Admission medication history interview status for this patient is complete. See [facility] admission navigator for allergy information, prior to admission medications and immunization status.     Medication history interview source(s):Patient  Medication history resources (including written lists, pill bottles, clinic record):None    Changes made to PTA medication list:  Added: vitamin C, B-complex, HCtz, MVI, vitamin D, Fish oil (dose unknown)  Deleted: none  Changed: none    Actions taken by pharmacist (provider contacted, etc):None     Additional medication history information:None    Medication reconciliation/reorder completed by provider prior to medication history? No    For patients on insulin therapy: no (Yes/No)   Lantus/levemir/NPH/Mix 70/30 dose: _____ in AM/PM or twice daily   Sliding scale Novolog Y/N   If Yes, do you have a baseline novolog pre-meal dose: ______units with meals   Patients eat three meals a day: Y/N   Any Barriers to therapy: cost of medications/comfortable with giving injections (if applicable)/ comfortable and confident with current diabetes regimen       Prior to Admission medications    Medication Sig Last Dose Taking? Auth Provider   VITAMIN D, CHOLECALCIFEROL, PO Take 2,500 Units by mouth At Bedtime 7/9/2017 at Unknown time Yes Unknown, Entered By History   Omega-3 Fatty Acids (FISH OIL PO) Take 2 capsules by mouth At Bedtime 7/8/2017 at Unknown time Yes Unknown, Entered By History   Ascorbic Acid (VITAMIN C) 100 MG TABS Take 1,500 mg by mouth At Bedtime  7/8/2017 at Unknown time Yes Reported, Patient   hydrochlorothiazide (HYDRODIURIL) 25 MG tablet Take 25 mg by mouth daily  7/8/2017 at hs Yes Reported, Patient   ASMANEX 30 METERED DOSES 110 MCG/INH inhaler INHALE ONE PUFF DAILY FOR ASTHMA, USE WHEN OUT OF TOWN AND NEBULIZER NOT AVAILABLE 7/9/2017 at AM Yes Reported, Patient   albuterol (ALBUTEROL) 108 (90 BASE) MCG/ACT inhaler Inhale 2 puffs into the lungs every 6 hours as needed for  shortness of breath / dyspnea or wheezing 7/8/2017 at Unknown time Yes Donald Garcia MD   MULTI-VITAMIN OR TABS 1 TABLET DAILY at bedtime Past Month at Unknown time Yes Reported, Patient   B COMPLEX OR 1 tablet orally at bedtime 7/8/2017 at Unknown time Yes Reported, Patient

## 2017-07-10 NOTE — DISCHARGE SUMMARY
[facility] Outpatient / Observation Unit  Discharge Summary        Delilah Lopes MRN# [id number]   YOB: 1955 Age: 61 year old     Date of Admission: 7/9/2017  Date of Discharge: 7/10/2017  Admitting Physician: Vipin Wynne MD  Discharge Physician: Cherie Duffy PA-C  Discharging Service: Hospitalist      Primary Provider: [facility]  Primary Care Physician Phone Number: [phone]         Primary Discharge Diagnoses:    Delilah Lopes was admitted on 7/9/2017 for concerns of acute chest pain.     1. Chest pain: ruled out ACS. Suspect non-cardiac in nature and possibly related to acid reflux.        Secondary Discharge Diagnoses:     Past Medical History:   Diagnosis Date     Essential hypertension 11/30/2015     HTN (hypertension)      Mild persistent asthma      Stricture and stenosis of esophagus     Dx 12/99, [facility]     Urethral stricture unspecified             Code Status:      Full Code        Brief Hospital Summary:       Reason for your hospital stay      You were admitted due to concerns of chest pain. Your workup included monitoring on telemetry, serial troponins which were negative, and exercise stress test that was normal.       Please refer to initial admission history and physical for further details.   Briefly, Delilah Lopes was admitted on 7/9/2017 for concerns of acute chest pain. Initial work up in the ED did not reveal evidence of STEMI or findings consistent with unstable angina or acute coronary ischemia. Pt was registered to the Observation Unit for further evaluation.     Pt ruled out with serial troponins, underwent Stress Echo that did not show evidence of significant coronary ischemia. Labs were reviewed and significant results addressed. On the day of discharge, pt was pain free, with no complaints of pain. Medications were reviewed and adjustments made as necessary. Pt is instructed to follow up as below.            Significant Lab During Hospitalization:        Recent Labs  Lab 07/10/17  0618 07/10/17  0218 07/09/17  2139   TROPONIN  --   --  0.00   TROPI <0.015The 99th percentile for upper reference range is 0.045 ug/L.  Troponin values in the range of 0.045 - 0.120 ug/L may be associated with risks of adverse clinical events. <0.015The 99th percentile for upper reference range is 0.045 ug/L.  Troponin values in the range of 0.045 - 0.120 ug/L may be associated with risks of adverse clinical events. <0.015The 99th percentile for upper reference range is 0.045 ug/L.  Troponin values in the range of 0.045 - 0.120 ug/L may be associated with risks of adverse clinical events.                Significant Imaging During Hospitalization:      Results for orders placed or performed during the hospital encounter of 07/09/17   Chest XR,  PA & LAT    Narrative    CHEST TWO VIEWS 7/9/2017 10:13 PM     HISTORY: chest pain    COMPARISON: 10/2/2014 chest x-ray      Impression    IMPRESSION: No acute disease. Lungs clear. Heart and pulmonary vessels  within normal limits. No pleural effusion.     KVNG MARIO MD     Exercise stress:  Interpretation Summary  1. Above average exercise capacity with target HR achieved.  2. The patient exhibited no chest pain during exercise.  3. This was a normal stress EKG with no evidence of stress-induced ischemia.  4. Rest echo: The resting phase of the study was normal. No regional wall  motion abnormalities noted. At least mild-moderate mitral regurgitation.  5. Stress echo: This was a normal stress echocardiogram with no evidence of  stress-induced ischemia.     No previous study for comparison.         Pending Results:      None        Consultations This Hospital Stay:      No consultations were requested during this admission         Discharge Instructions and Follow-Up:      Follow-up Appointments    Follow up with primary care provider, [facility], within 7 days for hospital follow-  "up and discuss blood pressure management. No follow up labs or test are needed.           Discharge Disposition:      Discharged to home         Discharge Medications:        Current Discharge Medication List      CONTINUE these medications which have NOT CHANGED    Details   VITAMIN D, CHOLECALCIFEROL, PO Take 2,500 Units by mouth At Bedtime      Omega-3 Fatty Acids (FISH OIL PO) Take 2 capsules by mouth At Bedtime      Ascorbic Acid (VITAMIN C) 100 MG TABS Take 1,500 mg by mouth At Bedtime       hydrochlorothiazide (HYDRODIURIL) 25 MG tablet Take 25 mg by mouth daily       ASMANEX 30 METERED DOSES 110 MCG/INH inhaler INHALE ONE PUFF DAILY FOR ASTHMA, USE WHEN OUT OF TOWN AND NEBULIZER NOT AVAILABLE  Refills: 3      albuterol (ALBUTEROL) 108 (90 BASE) MCG/ACT inhaler Inhale 2 puffs into the lungs every 6 hours as needed for shortness of breath / dyspnea or wheezing  Qty: 1 Inhaler, Refills: 1    Associated Diagnoses: Mild persistent asthma      MULTI-VITAMIN OR TABS 1 TABLET DAILY at bedtime      B COMPLEX OR 1 tablet orally at bedtime                 Allergies:         Allergies   Allergen Reactions     Cat Hair [Cats]      Sulfa Drugs Rash     Ultram [Tramadol Hcl]      Headache  Weirdness  And sick nasuea, vomiting           Condition and Physical on Discharge:      Discharge condition: Stable   Vitals: Blood pressure 138/84, pulse 74, temperature 97  F (36.1  C), temperature source Oral, resp. rate 16, height 1.651 m (5' 5\"), weight 62 kg (136 lb 9.6 oz), last menstrual period 02/12/2006, SpO2 95 %, not currently breastfeeding.  136 lbs 9.6 oz      GENERAL:  Comfortable.  PSYCH: pleasant, oriented, No acute distress.  HEENT:  PERRLA. Normal conjunctiva, normal hearing, nasal mucosa and oropharynx are normal.  NECK:  Supple, no neck vein distention, adenopathy or bruits, normal thyroid.  HEART:  Normal S1, S2 with no murmur, no pericardial rub, gallops or S3 or S4.  LUNGS:  Clear to auscultation, normal " respiratory effort. No wheezing, rales or ronchi.  ABDOMEN:  Soft, no hepatosplenomegaly, normal bowel sounds. Non-tender, non distended.   EXTREMITIES:  No pedal edema, +2 radial pulses bilateral and equal.  SKIN:  Dry to touch, No rash, wound or ulcerations.  NEUROLOGIC:  CN 2-12 intact, BL 5/5 symmetric upper and lower extremity strength, sensation is intact with no focal deficits.     Cherie Duffy PA-C

## 2017-07-10 NOTE — H&P
[facility]  Hospitalist Admission Note  Name: Delilah Lopes    MRN: [id number]  YOB: 1955    Age: 61 year old  Date of admission: 7/9/2017  Primary care provider: Kevon Duffy Leigh            Assessment and Plan:   Delilah Lopes is a 61 year old female with prior hx of mild persistent asthma, hypertension  who presented in the ED due to earlier episode of chest pain of which she describe as pressure like sensation with intensity of 5/10 accompanied with left shoulder and arm pain    1. Chest pain  2. Hx of hypertension  3. Hx of GERD  4. Hx of mild persistent asthma not in exacerbation    [place] under observation.  Chest pain protocol. NTG prn if with recurrence.  Troponin trend, tele-monitor.  Stress echo in AM if ruled out for ACS  Resume prior home hypertension medications.  ASA 81 mg daily.    Code status: full  Admit to OBS  Prophylaxis: ambulate  Disposition: home in 1 day          Chief Complaint:   Left sie chest pain and arm pain of few hours duration       Source of Information:   Patient with good reliability  Discussion with ED physician  Review of E chart records         History of Present Illness:   Delilah Lopes is a 61 year old female with prior hx of mild persistent asthma, hypertension  who presented in the ED due to earlier episode of chest pain of which she describe as pressure like sensation with intensity of 5/10 accompanied with left shoulder and arm pain. This occurred while she was sitting on her deck and has no accompanying symptoms of nausea/vomiting, cough, fever, chills nor abdominal pain.  She can usually walk for 2-3 miles and climb several flight of stairs with no prior occurrence of any chest pain/SOB.  In the ED she was given with ASA and NTG that provided some relief of symptoms.  She has stable hemodynamics, normal troponin and chest pain free now.            Past Medical History:     Past Medical History:   Diagnosis  Date     Essential hypertension 2015     HTN (hypertension)      Mild persistent asthma      Stricture and stenosis of esophagus     Dx , [facility]     Urethral stricture unspecified              Past Surgical History:     Past Surgical History:   Procedure Laterality Date     C NONSPECIFIC PROCEDURE       x3 ,,     HERNIA REPAIR      2 hernia repairs     HYSTERECTOMY, PAP NO LONGER INDICATED  2008     STENT      kidney             Social History:     Social History   Substance Use Topics     Smoking status: Never Smoker     Smokeless tobacco: Never Used     Alcohol use Yes      Comment: 2-3 times a week             Family History:   Family history was fully reviewed and non-contributory in this case.         Allergies:     Allergies   Allergen Reactions     Cat Hair [Cats]      Sulfa Drugs Rash     Ultram [Tramadol Hcl]      Headache  Weirdness  And sick nasuea, vomiting             Medications:     Prior to Admission medications    Medication Sig Last Dose Taking? Auth Provider   VITAMIN D, CHOLECALCIFEROL, PO Take 2,500 Units by mouth At Bedtime 2017 at Unknown time Yes Unknown, Entered By History   Omega-3 Fatty Acids (FISH OIL PO) Take 2 capsules by mouth At Bedtime 2017 at Unknown time Yes Unknown, Entered By History   Ascorbic Acid (VITAMIN C) 100 MG TABS Take 1,500 mg by mouth At Bedtime  2017 at Unknown time Yes Reported, Patient   hydrochlorothiazide (HYDRODIURIL) 25 MG tablet Take 25 mg by mouth daily  2017 at hs Yes Reported, Patient   ASMANEX 30 METERED DOSES 110 MCG/INH inhaler INHALE ONE PUFF DAILY FOR ASTHMA, USE WHEN OUT OF TOWN AND NEBULIZER NOT AVAILABLE 2017 at AM Yes Reported, Patient   albuterol (ALBUTEROL) 108 (90 BASE) MCG/ACT inhaler Inhale 2 puffs into the lungs every 6 hours as needed for shortness of breath / dyspnea or wheezing 2017 at Unknown time Yes Donald Garcia MD   MULTI-VITAMIN OR TABS 1 TABLET DAILY at bedtime Past Month  at Unknown time Yes Reported, Patient   B COMPLEX OR 1 tablet orally at bedtime 7/8/2017 at Unknown time Yes Reported, Patient             Review of Systems:   A Comprehensive greater than 10 system review of systems was carried out.  Pertinent positives and negatives are noted above.  Otherwise negative for contributory information.           Physical Exam:   Blood pressure 144/88, temperature 98  F (36.7  C), temperature source Oral, resp. rate 29, weight 59 kg (130 lb), last menstrual period 02/12/2006, SpO2 100 %, not currently breastfeeding.  Wt Readings from Last 1 Encounters:   07/09/17 59 kg (130 lb)     Exam:  GENERAL: No apparent distress. Awake, alert, and fully oriented.  HEENT: Normocephalic, atraumatic. Extraocular movements intact.  CARDIOVASCULAR: Regular rate and rhythm without murmurs or rubs. No JVD  PULMONARY: Clear to auscultation, no wheezes, crackles  ABDOMINAL: Soft, non-tender, non-distended. Bowel sounds normoactive. No hepatosplenomegaly.  EXTREMITIES: No cyanosis or clubbing. No edema.  NEUROLOGICAL: CN 2-12 grossly intact, awake and alert x3, spontaneous and coherent speech. no focal neurological deficits.  DERMATOLOGICAL: No rash, ulcer, ecchymoses, jaundice.  Psych: not agitation, not combative, pleasant mood           Data:   EKG:  NSR with incomplete RBBB    Imaging:  Recent Results (from the past 48 hour(s))   Chest XR,  PA & LAT    Narrative    CHEST TWO VIEWS 7/9/2017 10:13 PM     HISTORY: chest pain    COMPARISON: 10/2/2014 chest x-ray      Impression    IMPRESSION: No acute disease. Lungs clear. Heart and pulmonary vessels  within normal limits. No pleural effusion.     KVNG MARIO MD       Labs:  No results for input(s): CULT in the last 168 hours.    Recent Labs  Lab 07/09/17  2139      POTASSIUM 3.1*   CHLORIDE 104   CO2 30   ANIONGAP 7   GLC 95   BUN 11   CR 0.71   GFRESTIMATED 84   GFRESTBLACK >90African American GFR Calc   SARITA 8.8       Recent Labs  Lab  07/09/17 2139   WBC 9.9   HGB 12.8   HCT 37.9   MCV 93          Recent Labs  Lab 07/09/17 2139   GLC 95     No results for input(s): INR in the last 168 hours.    Recent Labs  Lab 07/09/17 2139   TROPONIN 0.00   TROPI <0.015The 99th percentile for upper reference range is 0.045 ug/L.  Troponin values in the range of 0.045 - 0.120 ug/L may be associated with risks of adverse clinical events.

## 2017-07-10 NOTE — ED NOTES
[facility]  ED Nurse Handoff Report    Delilah Lopes is a 61 year old female with lt arm pain that radiates to chest, relieved with nitro on arrival.  Initial cardiac workup negative.  Pt is alert and independent in ambulation.    ED Chief complaint: Chest Pain  . ED Diagnosis:   Final diagnoses:   Acute chest pain     Allergies:   Allergies   Allergen Reactions     Cat Hair [Cats]      Sulfa Drugs Rash     Ultram [Tramadol Hcl]      Headache  Weirdness  And sick nasuea, vomiting       Code Status: Full Code  Activity level - Baseline/Home:  Independent. Activity Level - Current:   Independent. Lift room needed: No. Bariatric: No   Needed: No   Isolation: No. Infection: Not Applicable.     Vital Signs:   Vitals:    07/09/17 2138 07/09/17 2145 07/09/17 2200 07/09/17 2230   BP: (!) 198/108 (!) 157/92 138/80 144/88   Resp: 16 12 10 29   Temp: 98  F (36.7  C)      TempSrc: Oral      SpO2: 100%      Weight: 59 kg (130 lb)          Cardiac Rhythm:  ,      Pain level: 0-10 Pain Scale: 5  Patient confused: No. Patient Falls Risk: No.   Elimination Status: Has voided   Patient Report - Initial Complaint: chest pain. Focused Assessment: lt arm pain radiating to chest  Tests Performed: labs, xray. Abnormal Results: none  Treatments provided: nitro, asa  Family Comments: daughter at bedside  OBS brochure/video discussed/provided to patient:  N/A  ED Medications:   Medications   nitroGLYcerin (NITROSTAT) sublingual tablet 0.4 mg (0.4 mg Sublingual Given 7/9/17 2151)   nitroGLYcerin (NITROSTAT) sublingual tablet 0.4 mg (not administered)   potassium chloride (KLOR-CON) Packet 40 mEq (not administered)   aspirin tablet 325 mg (325 mg Oral Given 7/9/17 2151)     Drips infusing:  No         ED Nurse Name/Phone Number: Jonathan Seaver,   11:25 PM    RECEIVING UNIT ED HANDOFF REVIEW    Above ED Nurse Handoff Report was reviewed: Yes  Reviewed by: Keesha Niño on July 9, 2017 at 11:28 PM

## 2017-07-10 NOTE — PLAN OF CARE
Problem: Discharge Planning  Goal: Discharge Planning (Adult, OB, Behavioral, Peds)  Outcome: Improving  PRIMARY DIAGNOSIS: CHEST PAIN  OUTPATIENT/OBSERVATION GOALS TO BE MET BEFORE DISCHARGE:  1. Ruled out acute coronary syndrome (negative or stable Troponin):  Neg x 2, next to be drawn at 0600  2. Pain Status: Pain free.  3. Appropriate provocative testing performed: Yes  - Stress Test Procedure: Regular, scheduled for tomorrow  - Interpretation of cardiac rhythm per telemetry tech: SR 82     4. Cleared by Consultants (if applicable):No  5. Return to near baseline physical activity: Yes  Discharge Planner Nurse   Safe discharge environment identified: Yes  Barriers to discharge: Yes, exercise stress test in am, trop x 2       Entered by: Juarez Alves 07/10/2017 4:44 AM     Please review provider order for any additional goals.   Nurse to notify provider when observation goals have been met and patient is ready for discharge.  Pt A&O x4, VSS. Pt denies chest/back pain, SOB, dizziness. Pt on clear liquid diet. Pt received 40 mEq of oral potassium, K was originally 3.1 in ED. Pt was oriented to unit and room. Will continue to monitor.

## 2017-07-10 NOTE — ED PROVIDER NOTES
History     Chief Complaint:  Chest Pain      HPI   Delilah Lopes is a 61 year old female with a history of hypertension and asthma who presents with chest pain. The patient states that she has had left arm pain since 1630 this evening. Approximately one hour ago while sitting outside on her deck, she developed chest pain. She describes the pain as a pressure sensation and notes that it is nonexertional. Initially, her chest pain was a 5/10, however, after nitroglycerin it became a 2/10. The patient has never had pain like this previously. She denies any nausea, vomiting, or diaphoresis.     Cardiac Risk Factors   Sex: Female   Tobacco: Negative   Hypertension:Positive  Diabetes: Negative  Hyperlipidemia: Negative  Family History: Positive    PE/DVT Risk Factors   Personal History: Negative  Recent Travel: Negative   Recent Surgery/Hospitalization: Negative  Tobacco: Negative  Family History: Negative    Allergies:  Sulfa Drugs  Ultram      Medications:    Ascorbic Acid  Hydrodiuril  Asmanex  Albuterol      Past Medical History:    Hypertension   Asthma  Stricture and stenosis of esophagus   Urethral stricture    Past Surgical History:    Hernia repair x 2  Hysterectomy   Kidney Stent    Family History:    Heart Disease-Father  Cancer-Mother, Son  Hypertension-Father, Brother  Lipids-Father    Social History:  Marital Status:   Presents to the ED with daughter  Tobacco Use: Never Used  Alcohol Use: Yes  PCP: [facility]      Review of Systems   Constitutional: Negative for diaphoresis.   Cardiovascular: Positive for chest pain.   Gastrointestinal: Negative for nausea and vomiting.   All other systems reviewed and are negative.      Physical Exam   First Vitals:  BP: (!) 198/108  Heart Rate: 97  Temp: 98  F (36.7  C)  Resp: 16  Weight: 59 kg (130 lb)  SpO2: 100 %    Physical Exam  Constitutional:  Oriented to person, place, and time. Well appearing  HENT:   Head:    Normocephalic.    Mouth/Throat:   Oropharynx is clear and moist.   Eyes:    EOM are normal. Pupils are equal, round, and reactive to light.   Neck:    Neck supple.   Cardiovascular:  Normal rate, regular rhythm and normal heart sounds.      Exam reveals no gallop and no friction rub.       No murmur heard.  Pulmonary/Chest:  Effort normal and breath sounds normal.      No respiratory distress. No wheezes. No rales.      No reproducible chest wall pain.  Abdominal:   Soft. No distension. No tenderness. No rebound and no guarding.   Musculoskeletal:  Normal range of motion. 2+ distal equal pulses.     No leg calf tenderness, swelling or edema.   Neurological:   Alert and oriented to person, place, and time.           Moves all 4 extremities spontaneously    Skin:    No rash noted. No pallor.      Emergency Department Course   ECG:  @2136  Indication: Chest pain   Vent. Rate 98 bpm. UT interval 146 ms. QRS duration 106 ms. QT/QTc 352/449 ms. P-R-T axis 72 53 58.   Normal sinus rhythm. Possible left atrial enlargement Abnormal ECG.    Read @ 2136 by Dr. Samaniego.     Imaging:  Chest x-ray, PA & LAT:   No acute disease. Lungs clear. Heart and pulmonary vessels  within normal limits. No pleural effusion.   Report per radiology.   Radiographic findings were communicated with the patient who voiced understanding of the findings.    Laboratory:  (2139) Troponin POCT: 0.00  (2139) Troponin I: <0.015   CBC:  WBC 9.9, HGB 12.8, , otherwise WNL   CMP: Potassium 3.1 (L), otherwise WNL (Creatinine 0.71)     Interventions:  (2151) Nitroglycerin, 0.4 mg, SL   (2151) Aspirin, 325 mg, PO    Emergency Department Course:  Nursing notes and vitals reviewed.  I performed an exam of the patient as documented above.   EKG was done, interpretation as above.   A peripheral IV was established. Blood was drawn from the patient. This was sent for laboratory testing, findings above.    The patient was sent for a chest x-ray while in the emergency  department, findings above.    Findings and plan explained to the patient who consents to observation.   (1129) I discussed the patient with Dr. Wynne of the hospitalist service, who will admit the patient to an observation bed for further monitoring, evaluation, and treatment.     Impression & Plan    Medical Decision Making:  Delilah Lopes is a 61 year old female who presents for evaluation of chest pain.    A broad differential was of course considered.  Certainly ischemia is in differential. I doubt pulmonary embolism, aortic dissection, pneumonia or pneumothorax.  Other etiologies of chest pain considered in this patient included chest wall source, esophageal spasm or GI source, pleuritis, referred pain, etc. Her symptoms are unlikely due to PE as she is pain free after nitroglycerin and without risk factors.      My suspicion of unstable angina at this point is very low and given risk/benefit ratio would not start Lovenox or heparin. Given the nature and timing of the patient's symptoms, I will admit the patient  to the hospital for further workup and treatment  The patient agrees to be admitted and all questions were answered.      Her  pain is improved at this time after given nitro medications.         Diagnosis:    ICD-10-CM    1. Acute chest pain R07.9        Disposition:  Admitted to the hospitalist.         I, Elda Moise, am serving as a scribe on 7/9/2017 at 10:05 PM to personally document services performed by Dr. Samaniego based on my observations and the provider's statements to me.   7/9/2017   [facility] EMERGENCY DEPARTMENT       Abler Samaniego MD  07/10/17 0255

## 2017-07-10 NOTE — PLAN OF CARE
Problem: Discharge Planning  Goal: Discharge Planning (Adult, OB, Behavioral, Peds)  Patient's After Visit Summary was reviewed with patient   Patient verbalized understanding of After Visit Summary, recommended follow up and was given an opportunity to ask questions.   Discharge medications sent home with patient/family: Not applicable   Discharged with spouse

## 2017-07-11 ENCOUNTER — TELEPHONE (OUTPATIENT)
Dept: FAMILY MEDICINE | Facility: CLINIC | Age: 62
End: 2017-07-11

## 2017-07-11 NOTE — TELEPHONE ENCOUNTER
"[facility] 7/10/17  Chief Complaint: Acute Chest Pain    ED / Discharge Outreach Protocol    Patient Contact    Attempt # 1    Was call answered?  Yes.  \"May I please speak with <patient name>\"  Is patient available?   No. Left message with  for patient to call me back.    Tuan Carranza RN, BSN      Follow-up Appointments           Follow-up and recommended labs and tests          Follow up with primary care provider, [facility], within 7 days for hospital follow- up and discuss blood pressure management.  No follow up labs or test are needed.                "

## 2017-07-11 NOTE — TELEPHONE ENCOUNTER
"Hospital/TCU/ED for chronic condition Discharge Protocol    \"Hi, my name is Mercy Mohan, a registered nurse, and I am calling from [facility].  I am calling to follow up and see how things are going for you after your recent emergency visit/hospital/TCU stay.\"    Tell me how you are doing now that you are home?\" I am just fine\"       Discharge Instructions    \"Let's review your discharge instructions.  What is/are the follow-up recommendations?  Pt. Response: f/u with doctor     \"Has an appointment with your primary care provider been scheduled?\"   No (schedule appointment)    \"When you see the provider, I would recommend that you bring your medications with you.\"    Medications    \"Tell me what changed about your medicines when you discharged?\"    Changes to chronic meds?    0-1    \"What questions do you have about your medications?\"    None     New diagnoses of heart failure, COPD, diabetes, or MI?    No              Medication reconciliation completed? Yes  Was MTM referral placed (*Make sure to put transitions as reason for referral)?   No    Call Summary    \"What questions or concerns do you have about your recent visit and your follow-up care?\"     none    \"If you have questions or things don't continue to improve, we encourage you contact us through the main clinic number (give number).  Even if the clinic is not open, triage nurses are available 24/7 to help you.     We would like you to know that our clinic has extended hours (provide information).  We also have urgent care (provide details on closest location and hours/contact info)\"      \"Thank you for your time and take care!\"         Mercy Mohan RN    "

## 2017-07-12 ENCOUNTER — OFFICE VISIT (OUTPATIENT)
Dept: FAMILY MEDICINE | Facility: CLINIC | Age: 62
End: 2017-07-12
Payer: COMMERCIAL

## 2017-07-12 VITALS
DIASTOLIC BLOOD PRESSURE: 80 MMHG | BODY MASS INDEX: 22.19 KG/M2 | HEIGHT: 65 IN | TEMPERATURE: 98 F | HEART RATE: 82 BPM | SYSTOLIC BLOOD PRESSURE: 130 MMHG | WEIGHT: 133.2 LBS | OXYGEN SATURATION: 98 %

## 2017-07-12 DIAGNOSIS — N28.1 ACQUIRED CYST OF KIDNEY: Primary | ICD-10-CM

## 2017-07-12 DIAGNOSIS — R07.9 ACUTE CHEST PAIN: ICD-10-CM

## 2017-07-12 PROCEDURE — 99213 OFFICE O/P EST LOW 20 MIN: CPT | Performed by: NURSE PRACTITIONER
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
## 2017-07-19 ENCOUNTER — TELEPHONE (OUTPATIENT)
Dept: FAMILY MEDICINE | Facility: CLINIC | Age: 62
End: 2017-07-19

## 2017-07-19 NOTE — TELEPHONE ENCOUNTER
Panel Management Review      Patient has the following on her problem list:     Asthma review     ACT Total Scores 2/8/2017   ACT TOTAL SCORE (Goal Greater than or Equal to 20) 24   In the past 12 months, how many times did you visit the emergency room for your asthma without being admitted to the hospital? 0   In the past 12 months, how many times were you hospitalized overnight because of your asthma? 0      1. Is Asthma diagnosis on the Problem List? Yes    2. Is Asthma listed on Health Maintenance? Yes    3. Patient is due for:  ACT and AAP    Hypertension   Last three blood pressure readings:  BP Readings from Last 3 Encounters:   07/12/17 130/80   07/10/17 138/84   02/08/17 110/70     Blood pressure: PASSED    HTN Guidelines:  Age 18-59 BP range:  Less than 140/90  Age 60-85 with Diabetes:  Less than 140/90  Age 60-85 without Diabetes:  less than 150/90          Composite cancer screening  Chart review shows that this patient is due/due soon for the following Mammogram  Summary:    Patient is due/failing the following:   MAMMOGRAM    Action needed:   Patient needs referral/order: mammogram     Type of outreach:    Phone, left message for patient to call back.     Questions for provider review:    None                                                                                                                                    PERLITA Nayak       Chart routed to  .

## 2017-07-19 NOTE — TELEPHONE ENCOUNTER
Pt returned call, given message below. She states her last Mammo was in December 2016 with [facility] and it was normal.    Chris Rhoades   07/19/17

## 2017-09-22 DIAGNOSIS — N28.1 KIDNEY CYST, ACQUIRED: Primary | ICD-10-CM

## 2017-10-03 ENCOUNTER — OFFICE VISIT (OUTPATIENT)
Dept: NEPHROLOGY | Facility: CLINIC | Age: 62
End: 2017-10-03
Attending: INTERNAL MEDICINE
Payer: COMMERCIAL

## 2017-10-03 VITALS
OXYGEN SATURATION: 100 % | TEMPERATURE: 98.3 F | BODY MASS INDEX: 22.74 KG/M2 | HEART RATE: 74 BPM | WEIGHT: 136.5 LBS | HEIGHT: 65 IN | SYSTOLIC BLOOD PRESSURE: 143 MMHG | DIASTOLIC BLOOD PRESSURE: 89 MMHG

## 2017-10-03 DIAGNOSIS — I10 BENIGN ESSENTIAL HYPERTENSION: Primary | ICD-10-CM

## 2017-10-03 DIAGNOSIS — E87.6 HYPOKALEMIA: ICD-10-CM

## 2017-10-03 DIAGNOSIS — N28.1 KIDNEY CYST, ACQUIRED: ICD-10-CM

## 2017-10-03 DIAGNOSIS — N28.1 ACQUIRED CYST OF KIDNEY: ICD-10-CM

## 2017-10-03 LAB
ALBUMIN SERPL-MCNC: 3.9 G/DL (ref 3.4–5)
ALBUMIN UR-MCNC: NEGATIVE MG/DL
ANION GAP SERPL CALCULATED.3IONS-SCNC: 5 MMOL/L (ref 3–14)
APPEARANCE UR: CLEAR
BILIRUB UR QL STRIP: NEGATIVE
BUN SERPL-MCNC: 13 MG/DL (ref 7–30)
CALCIUM SERPL-MCNC: 8.8 MG/DL (ref 8.5–10.1)
CHLORIDE SERPL-SCNC: 98 MMOL/L (ref 94–109)
CO2 SERPL-SCNC: 32 MMOL/L (ref 20–32)
COLOR UR AUTO: YELLOW
CREAT SERPL-MCNC: 0.87 MG/DL (ref 0.52–1.04)
CREAT UR-MCNC: 48 MG/DL
ERYTHROCYTE [DISTWIDTH] IN BLOOD BY AUTOMATED COUNT: 12.1 % (ref 10–15)
FERRITIN SERPL-MCNC: 103 NG/ML (ref 8–252)
GFR SERPL CREATININE-BSD FRML MDRD: 66 ML/MIN/1.7M2
GLUCOSE SERPL-MCNC: 100 MG/DL (ref 70–99)
GLUCOSE UR STRIP-MCNC: NEGATIVE MG/DL
HCT VFR BLD AUTO: 39.2 % (ref 35–47)
HGB BLD-MCNC: 13 G/DL (ref 11.7–15.7)
HGB UR QL STRIP: NEGATIVE
IRON SATN MFR SERPL: 27 % (ref 15–46)
IRON SERPL-MCNC: 84 UG/DL (ref 35–180)
KETONES UR STRIP-MCNC: NEGATIVE MG/DL
LEUKOCYTE ESTERASE UR QL STRIP: NEGATIVE
MCH RBC QN AUTO: 31.3 PG (ref 26.5–33)
MCHC RBC AUTO-ENTMCNC: 33.2 G/DL (ref 31.5–36.5)
MCV RBC AUTO: 94 FL (ref 78–100)
MUCOUS THREADS #/AREA URNS LPF: PRESENT /LPF
NITRATE UR QL: NEGATIVE
PH UR STRIP: 7 PH (ref 5–7)
PHOSPHATE SERPL-MCNC: 3.1 MG/DL (ref 2.5–4.5)
PLATELET # BLD AUTO: 300 10E9/L (ref 150–450)
POTASSIUM SERPL-SCNC: 2.8 MMOL/L (ref 3.4–5.3)
PROT UR-MCNC: <0.05 G/L
PROT/CREAT 24H UR: NORMAL G/G CR (ref 0–0.2)
PTH-INTACT SERPL-MCNC: 27 PG/ML (ref 12–72)
RBC # BLD AUTO: 4.16 10E12/L (ref 3.8–5.2)
RBC #/AREA URNS AUTO: 0 /HPF (ref 0–2)
SODIUM SERPL-SCNC: 136 MMOL/L (ref 133–144)
SOURCE: ABNORMAL
SP GR UR STRIP: 1.01 (ref 1–1.03)
SQUAMOUS #/AREA URNS AUTO: <1 /HPF (ref 0–1)
TIBC SERPL-MCNC: 314 UG/DL (ref 240–430)
UROBILINOGEN UR STRIP-MCNC: 0 MG/DL (ref 0–2)
WBC # BLD AUTO: 6.9 10E9/L (ref 4–11)
WBC #/AREA URNS AUTO: <1 /HPF (ref 0–2)

## 2017-10-03 PROCEDURE — 99213 OFFICE O/P EST LOW 20 MIN: CPT | Mod: ZF

## 2017-10-03 PROCEDURE — 83550 IRON BINDING TEST: CPT | Performed by: INTERNAL MEDICINE

## 2017-10-03 PROCEDURE — 81001 URINALYSIS AUTO W/SCOPE: CPT | Performed by: INTERNAL MEDICINE

## 2017-10-03 PROCEDURE — 80069 RENAL FUNCTION PANEL: CPT | Performed by: INTERNAL MEDICINE

## 2017-10-03 PROCEDURE — 85027 COMPLETE CBC AUTOMATED: CPT | Performed by: INTERNAL MEDICINE

## 2017-10-03 PROCEDURE — 83540 ASSAY OF IRON: CPT | Performed by: INTERNAL MEDICINE

## 2017-10-03 PROCEDURE — 36415 COLL VENOUS BLD VENIPUNCTURE: CPT | Performed by: INTERNAL MEDICINE

## 2017-10-03 PROCEDURE — 84156 ASSAY OF PROTEIN URINE: CPT | Performed by: INTERNAL MEDICINE

## 2017-10-03 PROCEDURE — 82728 ASSAY OF FERRITIN: CPT | Performed by: INTERNAL MEDICINE

## 2017-10-03 PROCEDURE — 82306 VITAMIN D 25 HYDROXY: CPT | Performed by: INTERNAL MEDICINE

## 2017-10-03 PROCEDURE — 83970 ASSAY OF PARATHORMONE: CPT | Performed by: INTERNAL MEDICINE

## 2017-10-03 RX ORDER — LISINOPRIL 5 MG/1
5 TABLET ORAL DAILY
Qty: 30 TABLET | Refills: 1 | Status: SHIPPED | OUTPATIENT
Start: 2017-10-06 | End: 2017-11-17

## 2017-10-03 RX ORDER — VITAMIN E 268 MG
CAPSULE ORAL
COMMUNITY
Start: 2007-06-19 | End: 2017-12-14

## 2017-10-03 ASSESSMENT — PAIN SCALES - GENERAL: PAINLEVEL: NO PAIN (0)

## 2017-10-03 NOTE — NURSING NOTE
"Chief Complaint   Patient presents with     Consult For     Referral for kidney cyst aquired.       Initial /89  Pulse 74  Temp 98.3  F (36.8  C) (Oral)  Ht 1.651 m (5' 5\")  Wt 61.9 kg (136 lb 8 oz)  LMP 02/12/2006  SpO2 100%  BMI 22.71 kg/m2 Estimated body mass index is 22.71 kg/(m^2) as calculated from the following:    Height as of this encounter: 1.651 m (5' 5\").    Weight as of this encounter: 61.9 kg (136 lb 8 oz).  Medication Reconciliation: complete   Elda Alfaro., CMA    "

## 2017-10-03 NOTE — PROGRESS NOTES
Assessment and Plan:   1.  Hypokalemia-etiology unclear, needs further investigation. Likely secondary to HCTZ.  -we will check renin and aldosterone levels on 10/6 to rule out primary hyperaldosteronism   -we will repeat potassium level on 10/06  -patient was strongly advise to eat high potassium diet    2. Hypertension-patient's blood pressure is suboptimally controlled  -we will discontinue HCTZ given hypokalemia  -start Lisinopril 5 mg daily after blood work on 10/06/2017  -patient was advised to monitor her blood pressure twice daily and keep records for us to review    3. Renal lesion-patient was advised to follow up with Urologist as an outpatient. Referral was placed    Assessment and plan was discussed with patient and she voiced her understanding and agreement.    Patient was seen and discussed with Dr.Manske Tabby Waters MD  Nephrology Fellow      Consult:  Delilah Lopes was seen in consultation at the request of HARPREET Hamilton  for evaluation of renal cyst.    Reason for Visit:  Delilah Lopes is a 61 year old female with PMHx significant for mild persistent asthma, HTN and ovarian cysts s/p total hysterectomy with bilateral salpingectomy in 09/2007 . Post-op period was complicated by right ureteral obstruction which was treated with percutaneous nephrostomy tube and ureteral stent. Subsequently on 09/04/2007, the patient had  temporary right nephrostomy tube removed, then 6 weeks later stent was removed as well.   As per paper records [facility]: CT abd/pelvis in 12/2012 showed a solid appearing, exophytic lesion arising from the lower pole of the right kidney which measures 12 x 12 x 10 mm.  MRI abdomen in 01/2013 showed 0.8 cm exophytic cortical lesion in the lower pole of the right kidney. Follow up retroperitoneal ultrasound 12/11/2016 showed left kidney 10.3 cm in size and right kidney 10 cm in size with normal cortical thickness, no hydronephrosis. There was  1.1x1.1x1.1 cm exophytic hypoechoic mass at the lower pole. It appears stable compared to the 03/06/2013 ultrasound at which time measured 0.9 cm. The sonographic appearance favors a solid mass, less likely a cyst with internal echoes indeterminate right renal mass.  Patient states that she never been evaluated by urologist in the past. Patient denies family history of kidney disease or renal cancer.  She dose have history of hypertension. She takes hydrochlorothiazide 25 mg daily for it. She dose not check her blood pressure at home. Her blood pressure today is 143/89 mmHg. Patient reports that her father had hypertension  Her renal function is stable. She did have creatinine of 0.8 in 09//2007, then creatinine of 0.8 mg/dl 12/2014, then creatinine of 0.6 mg/dl in 2/2017, then creatinine of 0.7 mg/dl in 7/2017. She was admitted to cardiology service 07/09-07/10/2017 for evaluation of chest pain. Extensive work up showed no evidence of acute coronary syndrome. It was felt that patient's chest pain was likely non-cardiac in nature and possibly related to acid reflux. Patient's laboratory work up on 07/09 showed mild hypokalemia of 3.1 which resolved on following day with supplementation.  Today patient's laboratory work up showed creatinine of 0.87 mg/dl and hypokalemia of 2.8. Urine analysis is unremarkable. CBC is within acceptable limits. Iron replete. Vitamin D level is within acceptable limits.    Patient denies: fever, chills, weight loss, dizziness, adenopathy, sore throat, rhinorrhea, cough, shortness of breath , chest pain, palpitations, orthopnea, nausea, vomiting, abdominal pain, diarrhea, changes in bowel habits, dysuria, urinary frequency, urgency, hematuria, rash            HPI:         Kidney Disease and Medical Hx:       h/o HTN: Yes   Usual BP: Not checked       h/o DM: No       h/o Protein in Urine: No       h/o Blood in Urine: No       h/o Kidney Stones:No       h/o UTI  No       h/o Chronic NSAID  Use: No         Previous Transplant Hx:      No           ROS:   A comprehensive review of systems was obtained and negative, except as noted in the HPI or PMH.    Active Medical Problems:  Patient Active Problem List   Diagnosis     Stricture and stenosis of esophagus     Urethral stricture     Mild persistent asthma     Cold sore     CARDIOVASCULAR SCREENING; LDL GOAL LESS THAN 160     Essential hypertension     PMH:   Medical record was reviewed and PMH was discussed with patient and noted below.  Past Medical History:   Diagnosis Date     Essential hypertension 2015     HTN (hypertension)      Melanoma (H) 2016     Mild persistent asthma      Stricture and stenosis of esophagus     Dx , [place] [facility]     Urethral stricture unspecified      PSH:   Past Surgical History:   Procedure Laterality Date     C NONSPECIFIC PROCEDURE       x3 ,,     HERNIA REPAIR      2 hernia repairs     HYSTERECTOMY, PAP NO LONGER INDICATED       STENT  2017    kidney     SURGICAL PATHOLOGY EXAM  2017       Family Hx:   Family History   Problem Relation Age of Onset     CANCER Mother      ovarian cancer     HEART DISEASE Father      heart attacks with bypass     Hypertension Father      Lipids Father      C.A.D. Father      CANCER Maternal Grandmother      breast cancer     C.A.D. Paternal Grandfather      CANCER Paternal Aunt      cervicle cancer     CANCER Paternal Aunt      uterin cancer     DIABETES Paternal Aunt      Hypertension Brother      CANCER Son      testicular     Personal Hx:   Social History     Social History     Marital status:      Spouse name: N/A     Number of children: N/A     Years of education: N/A     Occupational History     Not on file.     Social History Main Topics     Smoking status: Never Smoker     Smokeless tobacco: Never Used     Alcohol use Yes      Comment: 2-3 times a week     Drug use: No     Sexual activity: Yes     Partners: Male     Other Topics  "Concern     Not on file     Social History Narrative       Allergies:  Allergies   Allergen Reactions     Nicotiana Tabacum      Other reaction(s): Breathing Difficulty     Cat Hair Extract      Cat Hair [Cats]      No Clinical Screening - See Comments      PN: LW Other1: -CATS     Sulfa Drugs Rash     Tramadol      Other reaction(s): Nausea  Headache  Weirdness  And sick nasuea, vomiting     Ultram [Tramadol Hcl]      Headache  Weirdness  And sick nasuea, vomiting       Medications:  Prior to Admission medications    Medication Sig Start Date End Date Taking? Authorizing Provider   vitamin E 400 UNIT capsule  6/19/07  Yes Reported, Patient   lisinopril (PRINIVIL/ZESTRIL) 5 MG tablet Take 1 tablet (5 mg) by mouth daily 10/6/17  Yes Tabby Waters MD   VITAMIN D, CHOLECALCIFEROL, PO Take 2,500 Units by mouth At Bedtime   Yes Unknown, Entered By History   Omega-3 Fatty Acids (FISH OIL PO) Take 2 capsules by mouth At Bedtime   Yes Unknown, Entered By History   Ascorbic Acid (VITAMIN C) 100 MG TABS Take 1,500 mg by mouth At Bedtime  6/19/07  Yes Reported, Patient   ASMANEX 30 METERED DOSES 110 MCG/INH inhaler INHALE ONE PUFF DAILY FOR ASTHMA, USE WHEN OUT OF TOWN AND NEBULIZER NOT AVAILABLE 10/26/16  Yes Reported, Patient   albuterol (ALBUTEROL) 108 (90 BASE) MCG/ACT inhaler Inhale 2 puffs into the lungs every 6 hours as needed for shortness of breath / dyspnea or wheezing 3/11/15  Yes Donald Garcia MD   MULTI-VITAMIN OR TABS 1 TABLET DAILY at bedtime   Yes Reported, Patient   B COMPLEX OR 1 tablet orally at bedtime   Yes Reported, Patient     Vitals:  /89  Pulse 74  Temp 98.3  F (36.8  C) (Oral)  Ht 1.651 m (5' 5\")  Wt 61.9 kg (136 lb 8 oz)  LMP 02/12/2006  SpO2 100%  BMI 22.71 kg/m2    Exam:  GENERAL APPEARANCE: alert and no distress  HENT: mouth without ulcers or lesions  LYMPHATICS: no cervical or supraclavicular nodes  RESP: lungs clear to auscultation - no rales, rhonchi or wheezes  CV: " regular rhythm, normal rate, no rub, no murmur  EDEMA: no LE edema bilaterally  ABDOMEN: soft, nondistended, nontender, bowel sounds normal  MS: extremities normal - no gross deformities noted, no evidence of inflammation in joints, no muscle tenderness  SKIN: no rash      Results:  Recent Results (from the past 336 hour(s))   Renal panel    Collection Time: 10/03/17  3:26 PM   Result Value Ref Range    Sodium 136 133 - 144 mmol/L    Potassium 2.8 (L) 3.4 - 5.3 mmol/L    Chloride 98 94 - 109 mmol/L    Carbon Dioxide 32 20 - 32 mmol/L    Anion Gap 5 3 - 14 mmol/L    Glucose 100 (H) 70 - 99 mg/dL    Urea Nitrogen 13 7 - 30 mg/dL    Creatinine 0.87 0.52 - 1.04 mg/dL    GFR Estimate 66 >60 mL/min/1.7m2    GFR Estimate If Black 80 >60 mL/min/1.7m2    Calcium 8.8 8.5 - 10.1 mg/dL    Phosphorus 3.1 2.5 - 4.5 mg/dL    Albumin 3.9 3.4 - 5.0 g/dL   CBC with platelets    Collection Time: 10/03/17  3:26 PM   Result Value Ref Range    WBC 6.9 4.0 - 11.0 10e9/L    RBC Count 4.16 3.8 - 5.2 10e12/L    Hemoglobin 13.0 11.7 - 15.7 g/dL    Hematocrit 39.2 35.0 - 47.0 %    MCV 94 78 - 100 fl    MCH 31.3 26.5 - 33.0 pg    MCHC 33.2 31.5 - 36.5 g/dL    RDW 12.1 10.0 - 15.0 %    Platelet Count 300 150 - 450 10e9/L   Ferritin    Collection Time: 10/03/17  3:26 PM   Result Value Ref Range    Ferritin 103 8 - 252 ng/mL   Iron and iron binding capacity    Collection Time: 10/03/17  3:26 PM   Result Value Ref Range    Iron 84 35 - 180 ug/dL    Iron Binding Cap 314 240 - 430 ug/dL    Iron Saturation Index 27 15 - 46 %   Protein  random urine with Creat Ratio    Collection Time: 10/03/17  3:29 PM   Result Value Ref Range    Protein Random Urine <0.05 g/L    Protein Total Urine g/gr Creatinine Unable to calculate due to low value 0 - 0.2 g/g Cr   UA with Microscopic reflex to Culture    Collection Time: 10/03/17  3:29 PM   Result Value Ref Range    Color Urine Yellow     Appearance Urine Clear     Glucose Urine Negative NEG^Negative mg/dL     Bilirubin Urine Negative NEG^Negative    Ketones Urine Negative NEG^Negative mg/dL    Specific Gravity Urine 1.009 1.003 - 1.035    Blood Urine Negative NEG^Negative    pH Urine 7.0 5.0 - 7.0 pH    Protein Albumin Urine Negative NEG^Negative mg/dL    Urobilinogen mg/dL 0.0 0.0 - 2.0 mg/dL    Nitrite Urine Negative NEG^Negative    Leukocyte Esterase Urine Negative NEG^Negative    Source Midstream Urine     WBC Urine <1 0 - 2 /HPF    RBC Urine 0 0 - 2 /HPF    Squamous Epithelial /HPF Urine <1 0 - 1 /HPF    Mucous Urine Present (A) NEG^Negative /LPF   Creatinine urine calculation only    Collection Time: 10/03/17  3:29 PM   Result Value Ref Range    Creatinine Urine 48 mg/dL     I have seen and examined this patient with the resident.  This note reflects our joint assessment and plan.     Marlene Brownlee MD

## 2017-10-03 NOTE — LETTER
10/3/2017       RE: Delilah Lopes  [address] [facility] [id number]     Dear Colleague,    Thank you for referring your patient, Delilah Lopes, to the [facility] NEPHROLOGY at [facility]. Please see a copy of my visit note below.    Assessment and Plan:   1.  Hypokalemia-etiology unclear, needs further investigation. Likely secondary to HCTZ.  -we will check renin and aldosterone levels on 10/6 to rule out primary hyperaldosteronism   -we will repeat potassium level on 10/06  -patient was strongly advise to eat high potassium diet    2. Hypertension-patient's blood pressure is suboptimally controlled  -we will discontinue HCTZ given hypokalemia  -start Lisinopril 5 mg daily after blood work on 10/06/2017  -patient was advised to monitor her blood pressure twice daily and keep records for us to review    3. Renal lesion-patient was advised to follow up with Urologist as an outpatient. Referral was placed    Assessment and plan was discussed with patient and she voiced her understanding and agreement.    Patient was seen and discussed with Dr.Manske Tabby Waters MD  Nephrology Fellow      Consult:  Delilah Lopes was seen in consultation at the request of HARPREET Hamilton  for evaluation of renal cyst.    Reason for Visit:  Delilah Lopes is a 61 year old female with PMHx significant for mild persistent asthma, HTN and ovarian cysts s/p total hysterectomy with bilateral salpingectomy in 09/2007 . Post-op period was complicated by right ureteral obstruction which was treated with percutaneous nephrostomy tube and ureteral stent. Subsequently on 09/04/2007, the patient had  temporary right nephrostomy tube removed, then 6 weeks later stent was removed as well.   As per paper records form [facility]: CT abd/pelvis in 12/2012 showed a solid appearing, exophytic lesion arising from the lower pole of the right kidney which measures  12 x 12 x 10 mm.  MRI abdomen in 01/2013 showed 0.8 cm exophytic cortical lesion in the lower pole of the right kidney. Follow up retroperitoneal ultrasound 12/11/2016 showed left kidney 10.3 cm in size and right kidney 10 cm in size with normal cortical thickness, no hydronephrosis. There was 1.1x1.1x1.1 cm exophytic hypoechoic mass at the lower pole. It appears stable compared to the 03/06/2013 ultrasound at which time measured 0.9 cm. The sonographic appearance favors a solid mass, less likely a cyst with internal echoes indeterminate right renal mass.  Patient states that she never been evaluated by urologist in the past. Patient denies family history of kidney disease or renal cancer.  She dose have history of hypertension. She takes hydrochlorothiazide 25 mg daily for it. She dose not check her blood pressure at home. Her blood pressure today is 143/89 mmHg. Patient reports that her father had hypertension  Her renal function is stable. She did have creatinine of 0.8 in 09//2007, then creatinine of 0.8 mg/dl 12/2014, then creatinine of 0.6 mg/dl in 2/2017, then creatinine of 0.7 mg/dl in 7/2017. She was admitted to cardiology service 07/09-07/10/2017 for evaluation of chest pain. Extensive work up showed no evidence of acute coronary syndrome. It was felt that patient's chest pain was likely non-cardiac in nature and possibly related to acid reflux. Patient's laboratory work up on 07/09 showed mild hypokalemia of 3.1 which resolved on following day with supplementation.  Today patient's laboratory work up showed creatinine of 0.87 mg/dl and hypokalemia of 2.8. Urine analysis is unremarkable. CBC is within acceptable limits. Iron replete. Vitamin D level is within acceptable limits.    Patient denies: fever, chills, weight loss, dizziness, adenopathy, sore throat, rhinorrhea, cough, shortness of breath , chest pain, palpitations, orthopnea, nausea, vomiting, abdominal pain, diarrhea, changes in bowel habits,  dysuria, urinary frequency, urgency, hematuria, rash            HPI:         Kidney Disease and Medical Hx:       h/o HTN: Yes   Usual BP: Not checked       h/o DM: No       h/o Protein in Urine: No       h/o Blood in Urine: No       h/o Kidney Stones:No       h/o UTI  No       h/o Chronic NSAID Use: No         Previous Transplant Hx:      No           ROS:   A comprehensive review of systems was obtained and negative, except as noted in the HPI or PMH.    Active Medical Problems:  Patient Active Problem List   Diagnosis     Stricture and stenosis of esophagus     Urethral stricture     Mild persistent asthma     Cold sore     CARDIOVASCULAR SCREENING; LDL GOAL LESS THAN 160     Essential hypertension     PMH:   Medical record was reviewed and PMH was discussed with patient and noted below.  Past Medical History:   Diagnosis Date     Essential hypertension 2015     HTN (hypertension)      Melanoma (H) 2016     Mild persistent asthma      Stricture and stenosis of esophagus     Dx , [place] [facility]     Urethral stricture unspecified      PSH:   Past Surgical History:   Procedure Laterality Date     C NONSPECIFIC PROCEDURE       x3 ,,     HERNIA REPAIR      2 hernia repairs     HYSTERECTOMY, PAP NO LONGER INDICATED       STENT  2017    kidney     SURGICAL PATHOLOGY EXAM  2017       Family Hx:   Family History   Problem Relation Age of Onset     CANCER Mother      ovarian cancer     HEART DISEASE Father      heart attacks with bypass     Hypertension Father      Lipids Father      C.A.D. Father      CANCER Maternal Grandmother      breast cancer     C.A.D. Paternal Grandfather      CANCER Paternal Aunt      cervicle cancer     CANCER Paternal Aunt      uterin cancer     DIABETES Paternal Aunt      Hypertension Brother      CANCER Son      testicular     Personal Hx:   Social History     Social History     Marital status:      Spouse name: N/A     Number of children: N/A      Years of education: N/A     Occupational History     Not on file.     Social History Main Topics     Smoking status: Never Smoker     Smokeless tobacco: Never Used     Alcohol use Yes      Comment: 2-3 times a week     Drug use: No     Sexual activity: Yes     Partners: Male     Other Topics Concern     Not on file     Social History Narrative       Allergies:  Allergies   Allergen Reactions     Nicotiana Tabacum      Other reaction(s): Breathing Difficulty     Cat Hair Extract      Cat Hair [Cats]      No Clinical Screening - See Comments      PN: LW Other1: -CATS     Sulfa Drugs Rash     Tramadol      Other reaction(s): Nausea  Headache  Weirdness  And sick nasuea, vomiting     Ultram [Tramadol Hcl]      Headache  Weirdness  And sick nasuea, vomiting       Medications:  Prior to Admission medications    Medication Sig Start Date End Date Taking? Authorizing Provider   vitamin E 400 UNIT capsule  6/19/07  Yes Reported, Patient   lisinopril (PRINIVIL/ZESTRIL) 5 MG tablet Take 1 tablet (5 mg) by mouth daily 10/6/17  Yes Tabby Waters MD   VITAMIN D, CHOLECALCIFEROL, PO Take 2,500 Units by mouth At Bedtime   Yes Unknown, Entered By History   Omega-3 Fatty Acids (FISH OIL PO) Take 2 capsules by mouth At Bedtime   Yes Unknown, Entered By History   Ascorbic Acid (VITAMIN C) 100 MG TABS Take 1,500 mg by mouth At Bedtime  6/19/07  Yes Reported, Patient   ASMANEX 30 METERED DOSES 110 MCG/INH inhaler INHALE ONE PUFF DAILY FOR ASTHMA, USE WHEN OUT OF TOWN AND NEBULIZER NOT AVAILABLE 10/26/16  Yes Reported, Patient   albuterol (ALBUTEROL) 108 (90 BASE) MCG/ACT inhaler Inhale 2 puffs into the lungs every 6 hours as needed for shortness of breath / dyspnea or wheezing 3/11/15  Yes Donald Garcia MD   MULTI-VITAMIN OR TABS 1 TABLET DAILY at bedtime   Yes Reported, Patient   B COMPLEX OR 1 tablet orally at bedtime   Yes Reported, Patient     Vitals:  /89  Pulse 74  Temp 98.3  F (36.8  C) (Oral)  Ht 1.651 m (5'  "5\")  Wt 61.9 kg (136 lb 8 oz)  LMP 02/12/2006  SpO2 100%  BMI 22.71 kg/m2    Exam:  GENERAL APPEARANCE: alert and no distress  HENT: mouth without ulcers or lesions  LYMPHATICS: no cervical or supraclavicular nodes  RESP: lungs clear to auscultation - no rales, rhonchi or wheezes  CV: regular rhythm, normal rate, no rub, no murmur  EDEMA: no LE edema bilaterally  ABDOMEN: soft, nondistended, nontender, bowel sounds normal  MS: extremities normal - no gross deformities noted, no evidence of inflammation in joints, no muscle tenderness  SKIN: no rash      Results:  Recent Results (from the past 336 hour(s))   Renal panel    Collection Time: 10/03/17  3:26 PM   Result Value Ref Range    Sodium 136 133 - 144 mmol/L    Potassium 2.8 (L) 3.4 - 5.3 mmol/L    Chloride 98 94 - 109 mmol/L    Carbon Dioxide 32 20 - 32 mmol/L    Anion Gap 5 3 - 14 mmol/L    Glucose 100 (H) 70 - 99 mg/dL    Urea Nitrogen 13 7 - 30 mg/dL    Creatinine 0.87 0.52 - 1.04 mg/dL    GFR Estimate 66 >60 mL/min/1.7m2    GFR Estimate If Black 80 >60 mL/min/1.7m2    Calcium 8.8 8.5 - 10.1 mg/dL    Phosphorus 3.1 2.5 - 4.5 mg/dL    Albumin 3.9 3.4 - 5.0 g/dL   CBC with platelets    Collection Time: 10/03/17  3:26 PM   Result Value Ref Range    WBC 6.9 4.0 - 11.0 10e9/L    RBC Count 4.16 3.8 - 5.2 10e12/L    Hemoglobin 13.0 11.7 - 15.7 g/dL    Hematocrit 39.2 35.0 - 47.0 %    MCV 94 78 - 100 fl    MCH 31.3 26.5 - 33.0 pg    MCHC 33.2 31.5 - 36.5 g/dL    RDW 12.1 10.0 - 15.0 %    Platelet Count 300 150 - 450 10e9/L   Ferritin    Collection Time: 10/03/17  3:26 PM   Result Value Ref Range    Ferritin 103 8 - 252 ng/mL   Iron and iron binding capacity    Collection Time: 10/03/17  3:26 PM   Result Value Ref Range    Iron 84 35 - 180 ug/dL    Iron Binding Cap 314 240 - 430 ug/dL    Iron Saturation Index 27 15 - 46 %   Protein  random urine with Creat Ratio    Collection Time: 10/03/17  3:29 PM   Result Value Ref Range    Protein Random Urine <0.05 g/L    " Protein Total Urine g/gr Creatinine Unable to calculate due to low value 0 - 0.2 g/g Cr   UA with Microscopic reflex to Culture    Collection Time: 10/03/17  3:29 PM   Result Value Ref Range    Color Urine Yellow     Appearance Urine Clear     Glucose Urine Negative NEG^Negative mg/dL    Bilirubin Urine Negative NEG^Negative    Ketones Urine Negative NEG^Negative mg/dL    Specific Gravity Urine 1.009 1.003 - 1.035    Blood Urine Negative NEG^Negative    pH Urine 7.0 5.0 - 7.0 pH    Protein Albumin Urine Negative NEG^Negative mg/dL    Urobilinogen mg/dL 0.0 0.0 - 2.0 mg/dL    Nitrite Urine Negative NEG^Negative    Leukocyte Esterase Urine Negative NEG^Negative    Source Midstream Urine     WBC Urine <1 0 - 2 /HPF    RBC Urine 0 0 - 2 /HPF    Squamous Epithelial /HPF Urine <1 0 - 1 /HPF    Mucous Urine Present (A) NEG^Negative /LPF   Creatinine urine calculation only    Collection Time: 10/03/17  3:29 PM   Result Value Ref Range    Creatinine Urine 48 mg/dL     I have seen and examined this patient with the resident.  This note reflects our joint assessment and plan.     Marlene Brownlee MD    Again, thank you for allowing me to participate in the care of your patient.      Sincerely,    Tabby Waters MD

## 2017-10-03 NOTE — PATIENT INSTRUCTIONS
1. Do not take hydrochlorothiazide   2. You have to have a blood work on Friday , October 6th  3. Start taking a new blood pressure medication Lisinopril 5 mg daily after your blood work on Friday  4. Monitor your blood pressure twice daily and keep records for us to review. Call NILDA Boogie [phone]  5. You should follow up with urologist as an outpatient   6.  Eat oseas potassium diet

## 2017-10-03 NOTE — MR AVS SNAPSHOT
After Visit Summary   10/3/2017    Delilah Lopes    MRN: [id number]           Patient Information     Date Of Birth          1955        Visit Information        Provider Department      10/3/2017 4:00 PM Tabby Waters MD [facility]        Today's Diagnoses     Benign essential hypertension    -  1    Acquired cyst of kidney        Hypokalemia          Care Instructions    1. Do not take hydrochlorothiazide   2. You have to have a blood work on Friday , October 6th  3. Start taking a new blood pressure medication Lisinopril 5 mg daily after your blood work on Friday  4. Monitor your blood pressure twice daily and keep records for us to review. Call RN Chuyita [phone]  5. You should follow up with urologist as an outpatient   6.  Eat oseas potassium diet          Follow-ups after your visit        Additional Services     UROLOGY ADULT REFERRAL       Your provider has referred you to: [facility]: Kidney Stone Program - [place] [phone]   [email or web address]        Please be aware that coverage of these services is subject to the terms and limitations of your health insurance plan.  Call member services at your health plan with any benefit or coverage questions.      Please bring the following with you to your appointment:    (1) Any X-Rays, CTs or MRIs which have been performed.  Contact the facility where they were done to arrange for  prior to your scheduled appointment.    (2) List of current medications  (3) This referral request   (4) Any documents/labs given to you for this referral                  Your next 10 appointments already scheduled     Nov 17, 2017  9:20 AM CST   (Arrive by 9:05 AM)   New Patient Visit with Colin Blank MD   [facility] and [facility]rolog[facility] ([facility])    [address]  4th Floor  [facility] [address]  "  [phone]              Future tests that were ordered for you today     Open Future Orders        Priority Expected Expires Ordered    Basic metabolic panel Routine 10/20/2017 2017 10/3/2017    Basic metabolic panel Routine 10/6/2017 2017 10/3/2017    Renin Plasma Routine 10/6/2017 10/3/2018 10/3/2017    Aldosterone Routine 10/6/2017 10/3/2018 10/3/2017            Who to contact     If you have questions or need follow up information about today's clinic visit or your schedule please contact [facility] directly at [phone].  Normal or non-critical lab and imaging results will be communicated to you by Biscottihart, letter or phone within 4 business days after the clinic has received the results. If you do not hear from us within 7 days, please contact the clinic through Biscottihart or phone. If you have a critical or abnormal lab result, we will notify you by phone as soon as possible.  Submit refill requests through Rentelligence or call your pharmacy and they will forward the refill request to us. Please allow 3 business days for your refill to be completed.          Additional Information About Your Visit        BiscottiharGetOutfitted Information     Rentelligence lets you send messages to your doctor, view your test results, renew your prescriptions, schedule appointments and more. To sign up, go to [email or web address] . Click on \"Log in\" on the left side of the screen, which will take you to the Welcome page. Then click on \"Sign up Now\" on the right side of the page.     You will be asked to enter the access code listed below, as well as some personal information. Please follow the directions to create your username and password.     Your access code is: [email or web address]  Expires: 10/8/2017 11:07 AM     Your access code will  in 90 days. If you need help or a new code, please call your [place] clinic or [phone].        Care EveryWhere ID     This is your Care EveryWhere ID. This could be used by other " "organizations to access your [place] medical records  [id number]        Your Vitals Were     Pulse Temperature Height Last Period Pulse Oximetry BMI (Body Mass Index)    74 98.3  F (36.8  C) (Oral) 1.651 m (5' 5\") 02/12/2006 100% 22.71 kg/m2       Blood Pressure from Last 3 Encounters:   10/03/17 143/89   07/12/17 130/80   07/10/17 138/84    Weight from Last 3 Encounters:   10/03/17 61.9 kg (136 lb 8 oz)   07/12/17 60.4 kg (133 lb 3.2 oz)   07/10/17 62 kg (136 lb 9.6 oz)              We Performed the Following     UROLOGY ADULT REFERRAL          Today's Medication Changes          These changes are accurate as of: 10/3/17  5:54 PM.  If you have any questions, ask your nurse or doctor.               Start taking these medicines.        Dose/Directions    lisinopril 5 MG tablet   Commonly known as:  PRINIVIL/ZESTRIL   Used for:  Benign essential hypertension   Started by:  Tabby Waters MD        Dose:  5 mg   Start taking on:  10/6/2017   Take 1 tablet (5 mg) by mouth daily   Quantity:  30 tablet   Refills:  1         Stop taking these medicines if you haven't already. Please contact your care team if you have questions.     hydrochlorothiazide 25 MG tablet   Commonly known as:  HYDRODIURIL   Stopped by:  Tabby Waters MD                Where to get your medicines      These medications were sent to [facility]/pharmacy #[id number] - [place], [place] - [address]  [address] [address]    Hours:  [facility] Phone:  [phone]     lisinopril 5 MG tablet                Primary Care Provider Office Phone # Fax #    Nelly Hamilton [phone]       [facility] [address]  [facility] [address]        Equal Access to Services     [facility]: Denny frias Soashley, waaxda luqadaha, qaybta kaalmada adeegyanadia, kellie rowley . So [facility] [phone].    ATENCIÓN: Si catalina moran, tiene a chakraborty disposición " servicios gratuitos de asistencia lingüística. Gisselle lawson [phone].    We comply with applicable federal civil rights laws and [place] laws. We do not discriminate on the basis of race, color, national origin, age, disability, sex, sexual orientation, or gender identity.            Thank you!     Thank you for choosing [facility]  for your care. Our goal is always to provide you with excellent care. Hearing back from our patients is one way we can continue to improve our services. Please take a few minutes to complete the written survey that you may receive in the mail after your visit with us. Thank you!             Your Updated Medication List - Protect others around you: Learn how to safely use, store and throw away your medicines at www.disposemymeds.org.          This list is accurate as of: 10/3/17  5:54 PM.  Always use your most recent med list.                   Brand Name Dispense Instructions for use Diagnosis    albuterol 108 (90 BASE) MCG/ACT Inhaler    PROAIR HFA    1 Inhaler    Inhale 2 puffs into the lungs every 6 hours as needed for shortness of breath / dyspnea or wheezing    Mild persistent asthma       ASMANEX 30 METERED DOSES 110 MCG/INH inhaler   Generic drug:  mometasone      INHALE ONE PUFF DAILY FOR ASTHMA, USE WHEN OUT OF TOWN AND NEBULIZER NOT AVAILABLE        B COMPLEX PO      1 tablet orally at bedtime        FISH OIL PO      Take 2 capsules by mouth At Bedtime        lisinopril 5 MG tablet   Start taking on:  10/6/2017    PRINIVIL/ZESTRIL    30 tablet    Take 1 tablet (5 mg) by mouth daily    Benign essential hypertension       Multi-vitamin Tabs tablet   Generic drug:  multivitamin, therapeutic with minerals      1 TABLET DAILY at bedtime        Vitamin C 100 MG Tabs      Take 1,500 mg by mouth At Bedtime        VITAMIN D (CHOLECALCIFEROL) PO      Take 2,500 Units by mouth At Bedtime        vitamin E 400 UNIT capsule

## 2017-10-04 LAB — DEPRECATED CALCIDIOL+CALCIFEROL SERPL-MC: 54 UG/L (ref 20–75)

## 2017-10-06 DIAGNOSIS — I10 BENIGN ESSENTIAL HYPERTENSION: ICD-10-CM

## 2017-10-06 DIAGNOSIS — E87.6 HYPOKALEMIA: ICD-10-CM

## 2017-10-06 LAB
ANION GAP SERPL CALCULATED.3IONS-SCNC: 10 MMOL/L (ref 3–14)
BUN SERPL-MCNC: 17 MG/DL (ref 7–30)
CALCIUM SERPL-MCNC: 8.8 MG/DL (ref 8.5–10.1)
CHLORIDE SERPL-SCNC: 105 MMOL/L (ref 94–109)
CO2 SERPL-SCNC: 25 MMOL/L (ref 20–32)
CREAT SERPL-MCNC: 0.67 MG/DL (ref 0.52–1.04)
GFR SERPL CREATININE-BSD FRML MDRD: 90 ML/MIN/1.7M2
GLUCOSE SERPL-MCNC: 89 MG/DL (ref 70–99)
POTASSIUM SERPL-SCNC: 4 MMOL/L (ref 3.4–5.3)
SODIUM SERPL-SCNC: 140 MMOL/L (ref 133–144)

## 2017-10-06 PROCEDURE — 82088 ASSAY OF ALDOSTERONE: CPT | Mod: 90 | Performed by: FAMILY MEDICINE

## 2017-10-06 PROCEDURE — 99000 SPECIMEN HANDLING OFFICE-LAB: CPT | Performed by: FAMILY MEDICINE

## 2017-10-06 PROCEDURE — 84244 ASSAY OF RENIN: CPT | Mod: 90 | Performed by: FAMILY MEDICINE

## 2017-10-06 PROCEDURE — 80048 BASIC METABOLIC PNL TOTAL CA: CPT | Performed by: FAMILY MEDICINE

## 2017-10-06 PROCEDURE — 36415 COLL VENOUS BLD VENIPUNCTURE: CPT | Performed by: FAMILY MEDICINE

## 2017-10-07 LAB — ALDOST SERPL-MCNC: 18.5 NG/DL

## 2017-10-10 ENCOUNTER — PRE VISIT (OUTPATIENT)
Dept: UROLOGY | Facility: CLINIC | Age: 62
End: 2017-10-10

## 2017-10-11 LAB — RENIN PLAS-CCNC: 2.5 NG/ML/H

## 2017-11-13 ENCOUNTER — PRE VISIT (OUTPATIENT)
Dept: UROLOGY | Facility: CLINIC | Age: 62
End: 2017-11-13

## 2017-11-17 ENCOUNTER — TELEPHONE (OUTPATIENT)
Dept: FAMILY MEDICINE | Facility: CLINIC | Age: 62
End: 2017-11-17

## 2017-11-17 ENCOUNTER — TELEPHONE (OUTPATIENT)
Dept: NEPHROLOGY | Facility: CLINIC | Age: 62
End: 2017-11-17

## 2017-11-17 ENCOUNTER — OFFICE VISIT (OUTPATIENT)
Dept: UROLOGY | Facility: CLINIC | Age: 62
End: 2017-11-17

## 2017-11-17 VITALS
BODY MASS INDEX: 22.81 KG/M2 | HEIGHT: 65 IN | DIASTOLIC BLOOD PRESSURE: 97 MMHG | WEIGHT: 136.9 LBS | SYSTOLIC BLOOD PRESSURE: 175 MMHG | HEART RATE: 80 BPM

## 2017-11-17 DIAGNOSIS — I10 ESSENTIAL HYPERTENSION: Primary | ICD-10-CM

## 2017-11-17 DIAGNOSIS — N28.89 RIGHT RENAL MASS: Primary | ICD-10-CM

## 2017-11-17 RX ORDER — HYDROCHLOROTHIAZIDE 12.5 MG/1
12.5 CAPSULE ORAL DAILY
Qty: 90 CAPSULE | Refills: 3 | Status: SHIPPED | OUTPATIENT
Start: 2017-11-17 | End: 2017-12-01 | Stop reason: ALTCHOICE

## 2017-11-17 RX ORDER — AMOXICILLIN 500 MG/1
CAPSULE ORAL
Refills: 0 | COMMUNITY
Start: 2017-11-01 | End: 2017-12-14

## 2017-11-17 RX ORDER — HYDROCHLOROTHIAZIDE 25 MG/1
25 TABLET ORAL DAILY
Refills: 3 | COMMUNITY
Start: 2017-06-23 | End: 2017-11-17 | Stop reason: ALTCHOICE

## 2017-11-17 RX ORDER — AMLODIPINE BESYLATE 5 MG/1
5 TABLET ORAL DAILY
Qty: 90 TABLET | Refills: 3 | Status: SHIPPED | OUTPATIENT
Start: 2017-11-17 | End: 2017-11-30

## 2017-11-17 RX ORDER — TRIAMCINOLONE ACETONIDE 1 MG/G
CREAM TOPICAL
Refills: 2 | COMMUNITY
Start: 2017-11-03 | End: 2017-12-14

## 2017-11-17 RX ORDER — AZITHROMYCIN 250 MG/1
TABLET, FILM COATED ORAL
Refills: 0 | COMMUNITY
Start: 2017-11-04 | End: 2017-12-14

## 2017-11-17 RX ORDER — CLINDAMYCIN PHOSPHATE 10 MG/ML
SOLUTION TOPICAL
Refills: 1 | COMMUNITY
Start: 2017-10-12 | End: 2018-08-13

## 2017-11-17 RX ORDER — IBUPROFEN 800 MG
2500 TABLET ORAL
COMMUNITY
End: 2018-07-19

## 2017-11-17 ASSESSMENT — ENCOUNTER SYMPTOMS
MUSCLE CRAMPS: 0
SHORTNESS OF BREATH: 1
POSTURAL DYSPNEA: 0
HOARSE VOICE: 1
TASTE DISTURBANCE: 0
STIFFNESS: 1
DYSPNEA ON EXERTION: 1
SMELL DISTURBANCE: 0
SINUS CONGESTION: 1
MUSCLE WEAKNESS: 0
COUGH: 1
NECK MASS: 0
SORE THROAT: 0
SPUTUM PRODUCTION: 1
JOINT SWELLING: 1
NAIL CHANGES: 1
SINUS PAIN: 1
SKIN CHANGES: 1
HEMOPTYSIS: 0
COUGH DISTURBING SLEEP: 1
WHEEZING: 0
MYALGIAS: 1
NECK PAIN: 0
BACK PAIN: 0
ARTHRALGIAS: 1
SNORES LOUDLY: 0
POOR WOUND HEALING: 1
TROUBLE SWALLOWING: 0

## 2017-11-17 ASSESSMENT — PAIN SCALES - GENERAL: PAINLEVEL: NO PAIN (0)

## 2017-11-17 NOTE — PATIENT INSTRUCTIONS
Referral to Primary Care.    Schedule appointment for MRI and follow up with Dr. Blank.    It was a pleasure meeting with you today.  Thank you for allowing me and my team the privilege of caring for you today.  YOU are the reason we are here, and I truly hope we provided you with the excellent service you deserve.  Please let us know if there is anything else we can do for you so that we can be sure you are leaving completely satisfied with your care experience.        PERLITA Rose

## 2017-11-17 NOTE — MR AVS SNAPSHOT
After Visit Summary   11/17/2017    Delilah Lopes    MRN: [id number]           Patient Information     Date Of Birth          1955        Visit Information        Provider Department      11/17/2017 9:20 AM Colin Blank MD [facility] and [facility] for Prostate and Urologic Cancers        Today's Diagnoses     Right renal mass    -  1      Care Instructions    Referral to Primary Care.    Schedule appointment for MRI and follow up with Dr. Blank.    It was a pleasure meeting with you today.  Thank you for allowing me and my team the privilege of caring for you today.  YOU are the reason we are here, and I truly hope we provided you with the excellent service you deserve.  Please let us know if there is anything else we can do for you so that we can be sure you are leaving completely satisfied with your care experience.        PERLITA Rose          Follow-ups after your visit        Your next 10 appointments already scheduled     Nov 24, 2017  1:00 PM CST   MR ABDOMEN & PELVIS W/O & W CONTRAST with UUMR1   [facility] [place] ([facility], [facility])    [address] [address] [phone]           Take your medicines as usual, unless your doctor tells you not to. Bring a list of your current medicines to your exam (including vitamins, minerals and over-the-counter drugs). Also bring the results of similar scans you may have had.    The day before your exam, drink extra fluids at least six 8-ounce glasses (unless your doctor tells you to restrict your fluids).   Have a blood test (creatinine test) within 30 days of your exam. Go to your clinic or Diagnostic Imaging Department for this test.   Do not eat or drink for 6 hours prior to exam.  The MRI machine uses a strong magnet. Please wear clothes without metal (snaps, zippers). A sweatsuit works well, or we may give you a hospital gown.  Please remove  any body piercings and hair extensions before you arrive. You will also remove watches, jewelry, hairpins, wallets, dentures, partial dental plates and hearing aids. You may wear contact lenses, and you may be able to wear your rings. We have a safe place to keep your personal items, but it is safer to leave them at home.   **IMPORTANT** THE INSTRUCTIONS BELOW ARE ONLY FOR THOSE PATIENTS WHO HAVE BEEN TOLD THEY WILL RECEIVE SEDATION OR GENERAL ANESTHESIA DURING THEIR MRI PROCEDURE:  IF YOU WILL RECEIVE SEDATION (take medicine to help you relax during your exam):   You must get the medicine from your doctor before you arrive. Bring the medicine to the exam. Do not take it at home.   Arrive one hour early. Bring someone who can take you home after the test. Your medicine will make you sleepy. After the exam, you may not drive, take a bus or take a taxi by yourself.   No eating 8 hours before your exam. You may have clear liquids up until 4 hours before your exam. (Clear liquids include water, clear tea, black coffee and fruit juice without pulp.)  IF YOU WILL RECEIVE ANESTHESIA (be asleep for your exam):   Arrive 1 1/2 hours early. Bring someone who can take you home after the test. You may not drive, take a bus or take a taxi by yourself.   No eating 8 hours before your exam. You may have clear liquids up until 4 hours before your exam. (Clear liquids include water, clear tea, black coffee and fruit juice without pulp.)  If you have any questions, please contact your Imaging Department exam site.            Nov 30, 2017  9:10 AM CST   (Arrive by 8:55 AM)   New Patient Visit with Jordan Kumar MD   [facility] ([facility] [place])    [address] [place] [address] [phone]            Jan 05, 2018  9:45 AM CST   (Arrive by 9:30 AM)   New Patient Visit with Светлана Ramírez MD   [facility] ([facility] [facility])    [facility]  [facility] [address]  [address] [facility] [address] [phone]            2018  9:00 AM CST   (Arrive by 8:45 AM)   Return Visit with Colin Blank MD   [facility] and [facility] for Prostate and Urologic Cancers ([facility])    [address]  [address] [facility] [address] [phone]              Future tests that were ordered for you today     Open Future Orders        Priority Expected Expires Ordered    MR Abdomen & Pelvis w/o & w Contrast Routine  2018            Who to contact     Please call your clinic at [phone] to:    Ask questions about your health    Make or cancel appointments    Discuss your medicines    Learn about your test results    Speak to your doctor   If you have compliments or concerns about an experience at your clinic, or if you wish to file a complaint, please contact [facility] Patient Relations at [phone] or email us at [email or web address]         Additional Information About Your Visit        [employer] Information     Cylande is an electronic gateway that provides easy, online access to your medical records. With Cylande, you can request a clinic appointment, read your test results, renew a prescription or communicate with your care team.     To sign up for Cylande visit the website at [email or web address]   You will be asked to enter the access code listed below, as well as some personal information. Please follow the directions to create your username and password.     Your access code is: [email or web address]  Expires: 2018  5:30 AM     Your access code will  in 90 days. If you need help or a new code, please contact your [facility] Clinic or call [phone] for assistance.        Care EveryWhere ID     This is your Care EveryWhere ID. This could be used by other organizations to access your [facility]  "records  [id number]        Your Vitals Were     Pulse Height Last Period BMI (Body Mass Index)          80 1.651 m (5' 5\") 02/12/2006 22.78 kg/m2         Blood Pressure from Last 3 Encounters:   11/17/17 (!) 175/97   10/03/17 143/89   07/12/17 130/80    Weight from Last 3 Encounters:   11/17/17 62.1 kg (136 lb 14.4 oz)   10/03/17 61.9 kg (136 lb 8 oz)   07/12/17 60.4 kg (133 lb 3.2 oz)               Primary Care Provider Office Phone # Fax #    Nelly HWANG DIA Hamilton [phone]       [facility] [address] [facility] [address]        Equal Access to Services     [facility] : Hadii ann wei hadasho Soomaali, waaxda luqadaha, qaybta kaalmada adeegyada, kellie rowley . So [facility] [phone].    ATENCIÓN: Si habla español, tiene a chakraborty disposición servicios gratuitos de asistencia lingüística. Gisselle al [phone].    We comply with applicable federal civil rights laws and [place] laws. We do not discriminate on the basis of race, color, national origin, age, disability, sex, sexual orientation, or gender identity.            Thank you!     Thank you for choosing [facility]OLOGY AND [facility] FOR PROSTATE AND UROLOGIC CANCERS  for your care. Our goal is always to provide you with excellent care. Hearing back from our patients is one way we can continue to improve our services. Please take a few minutes to complete the written survey that you may receive in the mail after your visit with us. Thank you!             Your Updated Medication List - Protect others around you: Learn how to safely use, store and throw away your medicines at www.disposemymeds.org.          This list is accurate as of: 11/17/17 10:03 AM.  Always use your most recent med list.                   Brand Name Dispense Instructions for use Diagnosis    albuterol 108 (90 BASE) MCG/ACT Inhaler    PROAIR HFA    1 Inhaler    Inhale 2 puffs into the lungs every 6 hours as needed for shortness of breath / " dyspnea or wheezing    Mild persistent asthma       amoxicillin 500 MG capsule    AMOXIL     TAKE 2 CAPSULE BY MOUTH THREE TIMES A DAY AS DIRECTED        ASMANEX 30 METERED DOSES 110 MCG/INH inhaler   Generic drug:  mometasone      INHALE ONE PUFF DAILY FOR ASTHMA, USE WHEN OUT OF TOWN AND NEBULIZER NOT AVAILABLE        azithromycin 250 MG tablet    ZITHROMAX     TAKE 2 TABLETS BY MOUTH TODAY, THEN TAKE 1 TABLET DAILY FOR 4 DAYS        B COMPLEX PO      1 tablet orally at bedtime        clindamycin 1 % Swab    CLEOCIN T     USE 1 SWAB TOPICALLY DAILY        FISH OIL PO      Take 2 capsules by mouth At Bedtime        hydrochlorothiazide 25 MG tablet    HYDRODIURIL     Take 25 mg by mouth daily        lisinopril 5 MG tablet    PRINIVIL/ZESTRIL    30 tablet    Take 1 tablet (5 mg) by mouth daily    Benign essential hypertension       Multi-vitamin Tabs tablet   Generic drug:  multivitamin, therapeutic with minerals      1 TABLET DAILY at bedtime        triamcinolone 0.1 % cream    KENALOG     APPLY TWICE A DAY AS NEEDED TO AFFECTED AREA        Vitamin C 100 MG Tabs      Take 1,500 mg by mouth At Bedtime        * VITAMIN D (CHOLECALCIFEROL) PO      Take 2,500 Units by mouth At Bedtime        * Vitamin D3 400 UNITS Caps      Take 2,500 Units by mouth        vitamin E 400 UNIT capsule           * Notice:  This list has 2 medication(s) that are the same as other medications prescribed for you. Read the directions carefully, and ask your doctor or other care provider to review them with you.

## 2017-11-17 NOTE — TELEPHONE ENCOUNTER
Pt was seen with urology today and had elevated BP    When she was seen with nephrology she was started on Lisinopril 5 mg.   She stopped this due to side effects but did not report this or f/u with nephrology.     BP today 175/97     Pt advised to call nephrology nurse as she was to be check BP on regular basis and following up with them with readings.   She has not done this either.      If nephrology feels she needs to see PCP then call back for appt.     Pt expressed understanding and acceptance of the plan.  Pt had no further questions at this time.  Advised can call back to clinic at any time with concerns.       Mercy Mohan RN

## 2017-11-17 NOTE — PROGRESS NOTES
ASSESSMENT AND PLAN    Right Renal Mass 1.1cm    During counseling for this visit, we covered the risk of renal cell carcinoma and how this renal mass may be a non-cancerous lesion.  We covered options including observation, cryoablation, partial nephrectomy, and nephrectomy.  We covered different surgical approaches such as percutaneous, laparoscopic, robotic, and open surgery.  We covered the risk of observation which is metastatic spread and need for continued observation.  We covered surgical risks which include but are not limited to heart attack, stroke, blood clot in the legs or lungs, death, injury to surrounding organs (intestine, liver, spleen, pancreas, lung, muscles, nerves), hernias, loss of sensation around incisions, decreased renal function, and infection.  We discussed how blood transfusion may be necessary and the risks are viral illnesses such as HIV(AIDS) and Hepatitis.  Additional procedures may be necessary in the perioperative period.  If minimally invasive techniques are used it may be necessary to convert to open surgery, and if partial nephrectomy is attempted than full nephrectomy may be required.    Will proceed with observation at this point. We will obtain an MRI and follow up in one month.     High Blood Pressure:  - Follow up with PCP   _______________________________________________________________________    CHIEF COMPLAINT  It was my pleasure to see Delilah Lopes who is a 62 year old female for evaluation of renal mass    HPI   She has had a long standing hemorrhagic cyst for 5 years. She had a hysterectomy in 2011 and her ureter was nicked in the process.  She had a stent placed for the ureteral injury which was subsequently removed. Her cyst was discovered after that. In 2015 she was at [place] where they recommended observation of the cyst.  She does not have any pain.     Presenting symptom: Incidental.  Denies hematuria, weight loss, or bone pain.  Hereditary syndromes:  None  ECOG Performance Score: 0  0=Fully active, 1=Unable to do strenuous activity but capable of light work, 2=Ambulatory and capable of all selfcare, 3=Capable of limited selfcare, confined to bed >50% of waking hours, 4=Completely disabled.    RADIOLOGIC IMAGING      I reviewed the recent radiologic imaging and reports described above.    Patient Active Problem List    Diagnosis Date Noted     Essential hypertension 2015     Priority: Medium     CARDIOVASCULAR SCREENING; LDL GOAL LESS THAN 160 10/31/2010     Priority: Medium     Cold sore 2009     Priority: Medium     Mild persistent asthma 2006     Priority: Medium     Stricture and stenosis of esophagus 2002     Priority: Medium     Urethral stricture 2002     Priority: Medium     Problem list name updated by automated process. Provider to review       Past Medical History:   Diagnosis Date     Essential hypertension 2015     HTN (hypertension)      Melanoma (H) 2016     Mild persistent asthma      Stricture and stenosis of esophagus     Dx , [place] [facility]     Urethral stricture unspecified      Past Surgical History:   Procedure Laterality Date     C NONSPECIFIC PROCEDURE       x3 ,,     HERNIA REPAIR      2 hernia repairs     HYSTERECTOMY, PAP NO LONGER INDICATED       STENT  2017    kidney     SURGICAL PATHOLOGY EXAM  2017     Current Outpatient Prescriptions   Medication Sig Dispense Refill     Cholecalciferol (VITAMIN D3) 400 UNITS CAPS Take 2,500 Units by mouth       amoxicillin (AMOXIL) 500 MG capsule TAKE 2 CAPSULE BY MOUTH THREE TIMES A DAY AS DIRECTED  0     azithromycin (ZITHROMAX) 250 MG tablet TAKE 2 TABLETS BY MOUTH TODAY, THEN TAKE 1 TABLET DAILY FOR 4 DAYS  0     clindamycin (CLEOCIN T) 1 % SWAB USE 1 SWAB TOPICALLY DAILY  1     hydrochlorothiazide (HYDRODIURIL) 25 MG tablet Take 25 mg by mouth daily  3     triamcinolone (KENALOG) 0.1 % cream APPLY TWICE A DAY AS NEEDED TO  AFFECTED AREA  2     vitamin E 400 UNIT capsule        lisinopril (PRINIVIL/ZESTRIL) 5 MG tablet Take 1 tablet (5 mg) by mouth daily (Patient not taking: Reported on 11/17/2017) 30 tablet 1     VITAMIN D, CHOLECALCIFEROL, PO Take 2,500 Units by mouth At Bedtime       Omega-3 Fatty Acids (FISH OIL PO) Take 2 capsules by mouth At Bedtime       Ascorbic Acid (VITAMIN C) 100 MG TABS Take 1,500 mg by mouth At Bedtime        ASMANEX 30 METERED DOSES 110 MCG/INH inhaler INHALE ONE PUFF DAILY FOR ASTHMA, USE WHEN OUT OF TOWN AND NEBULIZER NOT AVAILABLE  3     albuterol (ALBUTEROL) 108 (90 BASE) MCG/ACT inhaler Inhale 2 puffs into the lungs every 6 hours as needed for shortness of breath / dyspnea or wheezing (Patient not taking: Reported on 11/17/2017) 1 Inhaler 1     MULTI-VITAMIN OR TABS 1 TABLET DAILY at bedtime       B COMPLEX OR 1 tablet orally at bedtime        Allergies   Allergen Reactions     Nicotiana Tabacum      Other reaction(s): Breathing Difficulty     Cat Hair Extract      Cat Hair [Cats]      No Clinical Screening - See Comments      PN: LW Other1: -CATS     Sulfa Drugs Rash     Tramadol      Other reaction(s): Nausea  Headache  Weirdness  And sick nasuea, vomiting     Ultram [Tramadol Hcl]      Headache  Weirdness  And sick nasuea, vomiting     Family History   Problem Relation Age of Onset     CANCER Mother      ovarian cancer     HEART DISEASE Father      heart attacks with bypass     Hypertension Father      Lipids Father      C.A.D. Father      CANCER Maternal Grandmother      breast cancer     C.A.D. Paternal Grandfather      CANCER Paternal Aunt      cervicle cancer     CANCER Paternal Aunt      uterin cancer     DIABETES Paternal Aunt      Hypertension Brother      CANCER Son      testicular      Social History     Occupational History     Not on file.     Social History Main Topics     Smoking status: Never Smoker     Smokeless tobacco: Never Used     Alcohol use Yes      Comment: 2-3 times a week  "    Drug use: No     Sexual activity: Yes     Partners: Male       REVIEW OF SYSTEMS  The following systems were evaluated:   Constitutional, Eyes, Ears/Nose/Throat, Respiratory, Cardiovascular, Gastrointestinal, Genitourinary, Musculoskeletal, Skin/Integument, Neurologic, Psychiatric, Hematologic/Lymphatic, Allergic/Immunologic, Endocrine.  The only pertinent positives were as follows:  There are no additional symptoms other than noted in HPI     PHYSICAL EXAM  Vitals:    11/17/17 0925   BP: (!) 175/97   Pulse: 72   Weight: 62.1 kg (136 lb 14.4 oz)   Height: 1.651 m (5' 5\")     Wt Readings from Last 3 Encounters:   11/17/17 62.1 kg (136 lb 14.4 oz)   10/03/17 61.9 kg (136 lb 8 oz)   07/12/17 60.4 kg (133 lb 3.2 oz)     Constitutional: Alert, no acute distress  Psychiatric: Normal mood and affect  Head: Normocephalic.  Neck: Neck supple. No adenopathy. Thyroid symmetric, normal size  ENT: Oropharynx clear.  Back: No spinal tenderness.  No costovertebral angle tenderness  Cardiovascular: RRR. No murmurs, clicks gallops or rub  Respiratory: Good diaphragmatic excursion. Lungs clear  Gastrointestinal: Abdomen soft, non-tender. No masses, organomegaly. No hernia  Skin: no suspicious lesions or rashes on abdomen  Extremities: No lower extremity edema.  No clubbing or cyanosis.  Neurologic: Cranial nerves grossly intact.  Equal strength and sensation on bilateral extremities.   : Deferred     Recent Labs   Lab Test  10/03/17   1526  07/09/17   2139  02/08/17   0836   WBC  6.9  9.9  6.2   HGB  13.0  12.8  13.1   HCT  39.2  37.9  39.0   PLT  300  312  313     Recent Labs   Lab Test  10/06/17   0849  10/03/17   1526  07/10/17   0618  07/09/17   2139  02/08/17   0836   NA  140  136   --   141  137   POTASSIUM  4.0  2.8*  3.7  3.1*  3.2*   CHLORIDE  105  98   --   104  98   CO2  25  32   --   30  31   ANIONGAP  10  5   --   7  8   GLC  89  100*   --   95  91   BUN  17  13   --   11  12   CR  0.67  0.87   --   0.71  0.66 "   GFRESTIMATED  90  66   --   84  >90  Non  GFR Calc     GFRESTBLACK  >90  80   --   >90   GFR Calc    >90   GFR Calc     SARITA  8.8  8.8   --   8.8  9.3     Recent Labs   Lab Test  10/03/17   1529   COLOR  Yellow   APPEARANCE  Clear   URINEGLC  Negative   URINEBILI  Negative   URINEKETONE  Negative   SG  1.009   UBLD  Negative   URINEPH  7.0   PROTEIN  Negative   NITRITE  Negative   LEUKEST  Negative   RBCU  0   WBCU  <1       I, Donovan Blank, reviewed these laboratory values.    Scribe Disclosure:   I, Nilsagina Alfred, am serving as a scribe; to document services personally performed by Donovan Blank MD -based on data collection and the provider's statements to me.     Provider Disclosure:  I agree with above History, Review of Systems, Physical exam and Plan.  I have reviewed the content of the documentation and have edited it as needed. I have personally performed the services documented here and the documentation accurately represents those services and the decisions I have made.      Electronically signed by:  Donovan Blank MD        Patient Care Team:  Nelly Hamilton NP as PCP - General (Nurse Practitioner - Family)  Colin Blnak MD as MD (Urology)  Yajaira Go, RN as Registered Nurse  ZAIRA FINE    Copy to patient  MICKY HIDALGO  [address] [facility] [address]    Answers for HPI/ROS submitted by the patient on 11/17/2017   General Symptoms: No  Skin Symptoms: Yes  HENT Symptoms: Yes  EYE SYMPTOMS: No  HEART SYMPTOMS: No  LUNG SYMPTOMS: Yes  INTESTINAL SYMPTOMS: No  URINARY SYMPTOMS: No  GYNECOLOGIC SYMPTOMS: No  BREAST SYMPTOMS: No  SKELETAL SYMPTOMS: Yes  BLOOD SYMPTOMS: No  NERVOUS SYSTEM SYMPTOMS: No  MENTAL HEALTH SYMPTOMS: No  Changes in hair: Yes  Changes in moles/birth marks: Yes  Itching: Yes  Rashes: No  Changes in nails: Yes  Acne: Yes  Hair in places you don't want it: Yes  Change in facial hair:  Yes  Warts: Yes  Non-healing sores: Yes  Scarring: Yes  Flaking of skin: Yes  Color changes of hands/feet in cold : Yes  Sun sensitivity: Yes  Skin thickening: Yes  Ear pain: No  Ear discharge: No  Hearing loss: No  Tinnitus: No  Nosebleeds: No  Congestion: Yes  Sinus pain: Yes  Trouble swallowing: No   Voice hoarseness: Yes  Mouth sores: No  Sore throat: No  Tooth pain: No  Gum tenderness: No  Bleeding gums: No  Change in taste: No  Change in sense of smell: No  Dry mouth: No  Hearing aid used: No  Neck lump: No  Cough: Yes  Sputum or phlegm: Yes  Coughing up blood: No  Difficulty breating or shortness of breath: Yes  Snoring: No  Wheezing: No  Difficulty breathing on exertion: Yes  Nighttime Cough: Yes  Difficulty breathing when lying flat: No  Back pain: No  Muscle aches: Yes  Neck pain: No  Swollen joints: Yes  Joint pain: Yes  Bone pain: No  Muscle cramps: No  Muscle weakness: No  Joint stiffness: Yes  Bone fracture: Yes

## 2017-11-17 NOTE — LETTER
11/17/2017     RE: Delilah Lopes  [address] [facility] [address]     Dear Colleague,    Thank you for referring your patient, Delilah Lopes, to the [facility] UROLOGY AND INST FOR PROSTATE AND UROLOGIC CANCERS at [facility]. Please see a copy of my visit note below.    ASSESSMENT AND PLAN    Right Renal Mass 1.1cm    During counseling for this visit, we covered the risk of renal cell carcinoma and how this renal mass may be a non-cancerous lesion.  We covered options including observation, cryoablation, partial nephrectomy, and nephrectomy.  We covered different surgical approaches such as percutaneous, laparoscopic, robotic, and open surgery.  We covered the risk of observation which is metastatic spread and need for continued observation.  We covered surgical risks which include but are not limited to heart attack, stroke, blood clot in the legs or lungs, death, injury to surrounding organs (intestine, liver, spleen, pancreas, lung, muscles, nerves), hernias, loss of sensation around incisions, decreased renal function, and infection.  We discussed how blood transfusion may be necessary and the risks are viral illnesses such as HIV(AIDS) and Hepatitis.  Additional procedures may be necessary in the perioperative period.  If minimally invasive techniques are used it may be necessary to convert to open surgery, and if partial nephrectomy is attempted than full nephrectomy may be required.    Will proceed with observation at this point. We will obtain an MRI and follow up in one month.     High Blood Pressure:  - Follow up with PCP   _______________________________________________________________________    CHIEF COMPLAINT  It was my pleasure to see Delilah Lopes who is a 62 year old female for evaluation of renal mass    HPI   She has had a long standing hemorrhagic cyst for 5 years. She had a hysterectomy in 2011 and her ureter was nicked in  the process.  She had a stent placed for the ureteral injury which was subsequently removed. Her cyst was discovered after that. In  she was at [place] where they recommended observation of the cyst.  She does not have any pain.     Presenting symptom: Incidental.  Denies hematuria, weight loss, or bone pain.  Hereditary syndromes: None  ECOG Performance Score: 0  0=Fully active, 1=Unable to do strenuous activity but capable of light work, 2=Ambulatory and capable of all selfcare, 3=Capable of limited selfcare, confined to bed >50% of waking hours, 4=Completely disabled.    RADIOLOGIC IMAGING      I reviewed the recent radiologic imaging and reports described above.    Patient Active Problem List    Diagnosis Date Noted     Essential hypertension 2015     Priority: Medium     CARDIOVASCULAR SCREENING; LDL GOAL LESS THAN 160 10/31/2010     Priority: Medium     Cold sore 2009     Priority: Medium     Mild persistent asthma 2006     Priority: Medium     Stricture and stenosis of esophagus 2002     Priority: Medium     Urethral stricture 2002     Priority: Medium     Problem list name updated by automated process. Provider to review       Past Medical History:   Diagnosis Date     Essential hypertension 2015     HTN (hypertension)      Melanoma (H) 2016     Mild persistent asthma      Stricture and stenosis of esophagus     Dx , [place] [facility]     Urethral stricture unspecified      Past Surgical History:   Procedure Laterality Date     C NONSPECIFIC PROCEDURE       x3 ,1985,     HERNIA REPAIR      2 hernia repairs     HYSTERECTOMY, PAP NO LONGER INDICATED       STENT  2017    kidney     SURGICAL PATHOLOGY EXAM  2017     Current Outpatient Prescriptions   Medication Sig Dispense Refill     Cholecalciferol (VITAMIN D3) 400 UNITS CAPS Take 2,500 Units by mouth       amoxicillin (AMOXIL) 500 MG capsule TAKE 2 CAPSULE BY MOUTH THREE TIMES A DAY AS  DIRECTED  0     azithromycin (ZITHROMAX) 250 MG tablet TAKE 2 TABLETS BY MOUTH TODAY, THEN TAKE 1 TABLET DAILY FOR 4 DAYS  0     clindamycin (CLEOCIN T) 1 % SWAB USE 1 SWAB TOPICALLY DAILY  1     hydrochlorothiazide (HYDRODIURIL) 25 MG tablet Take 25 mg by mouth daily  3     triamcinolone (KENALOG) 0.1 % cream APPLY TWICE A DAY AS NEEDED TO AFFECTED AREA  2     vitamin E 400 UNIT capsule        lisinopril (PRINIVIL/ZESTRIL) 5 MG tablet Take 1 tablet (5 mg) by mouth daily (Patient not taking: Reported on 11/17/2017) 30 tablet 1     VITAMIN D, CHOLECALCIFEROL, PO Take 2,500 Units by mouth At Bedtime       Omega-3 Fatty Acids (FISH OIL PO) Take 2 capsules by mouth At Bedtime       Ascorbic Acid (VITAMIN C) 100 MG TABS Take 1,500 mg by mouth At Bedtime        ASMANEX 30 METERED DOSES 110 MCG/INH inhaler INHALE ONE PUFF DAILY FOR ASTHMA, USE WHEN OUT OF TOWN AND NEBULIZER NOT AVAILABLE  3     albuterol (ALBUTEROL) 108 (90 BASE) MCG/ACT inhaler Inhale 2 puffs into the lungs every 6 hours as needed for shortness of breath / dyspnea or wheezing (Patient not taking: Reported on 11/17/2017) 1 Inhaler 1     MULTI-VITAMIN OR TABS 1 TABLET DAILY at bedtime       B COMPLEX OR 1 tablet orally at bedtime        Allergies   Allergen Reactions     Nicotiana Tabacum      Other reaction(s): Breathing Difficulty     Cat Hair Extract      Cat Hair [Cats]      No Clinical Screening - See Comments      PN: LW Other1: -CATS     Sulfa Drugs Rash     Tramadol      Other reaction(s): Nausea  Headache  Weirdness  And sick nasuea, vomiting     Ultram [Tramadol Hcl]      Headache  Weirdness  And sick nasuea, vomiting     Family History   Problem Relation Age of Onset     CANCER Mother      ovarian cancer     HEART DISEASE Father      heart attacks with bypass     Hypertension Father      Lipids Father      C.A.D. Father      CANCER Maternal Grandmother      breast cancer     C.A.D. Paternal Grandfather      CANCER Paternal Aunt      cervicle  "cancer     CANCER Paternal Aunt      uterin cancer     DIABETES Paternal Aunt      Hypertension Brother      CANCER Son      testicular      Social History     Occupational History     Not on file.     Social History Main Topics     Smoking status: Never Smoker     Smokeless tobacco: Never Used     Alcohol use Yes      Comment: 2-3 times a week     Drug use: No     Sexual activity: Yes     Partners: Male       REVIEW OF SYSTEMS  The following systems were evaluated:   Constitutional, Eyes, Ears/Nose/Throat, Respiratory, Cardiovascular, Gastrointestinal, Genitourinary, Musculoskeletal, Skin/Integument, Neurologic, Psychiatric, Hematologic/Lymphatic, Allergic/Immunologic, Endocrine.  The only pertinent positives were as follows:  There are no additional symptoms other than noted in HPI     PHYSICAL EXAM  Vitals:    11/17/17 0925   BP: (!) 175/97   Pulse: 72   Weight: 62.1 kg (136 lb 14.4 oz)   Height: 1.651 m (5' 5\")     Wt Readings from Last 3 Encounters:   11/17/17 62.1 kg (136 lb 14.4 oz)   10/03/17 61.9 kg (136 lb 8 oz)   07/12/17 60.4 kg (133 lb 3.2 oz)     Constitutional: Alert, no acute distress  Psychiatric: Normal mood and affect  Head: Normocephalic.  Neck: Neck supple. No adenopathy. Thyroid symmetric, normal size  ENT: Oropharynx clear.  Back: No spinal tenderness.  No costovertebral angle tenderness  Cardiovascular: RRR. No murmurs, clicks gallops or rub  Respiratory: Good diaphragmatic excursion. Lungs clear  Gastrointestinal: Abdomen soft, non-tender. No masses, organomegaly. No hernia  Skin: no suspicious lesions or rashes on abdomen  Extremities: No lower extremity edema.  No clubbing or cyanosis.  Neurologic: Cranial nerves grossly intact.  Equal strength and sensation on bilateral extremities.   : Deferred     Recent Labs   Lab Test  10/03/17   1526  07/09/17   2139  02/08/17   0836   WBC  6.9  9.9  6.2   HGB  13.0  12.8  13.1   HCT  39.2  37.9  39.0   PLT  300  312  313     Recent Labs   Lab Test "  10/06/17   0849  10/03/17   1526  07/10/17   0618  07/09/17   2139  02/08/17   0836   NA  140  136   --   141  137   POTASSIUM  4.0  2.8*  3.7  3.1*  3.2*   CHLORIDE  105  98   --   104  98   CO2  25  32   --   30  31   ANIONGAP  10  5   --   7  8   GLC  89  100*   --   95  91   BUN  17  13   --   11  12   CR  0.67  0.87   --   0.71  0.66   GFRESTIMATED  90  66   --   84  >90  Non  GFR Calc     GFRESTBLACK  >90  80   --   >90   GFR Calc    >90   GFR Calc     SARITA  8.8  8.8   --   8.8  9.3     Recent Labs   Lab Test  10/03/17   1529   COLOR  Yellow   APPEARANCE  Clear   URINEGLC  Negative   URINEBILI  Negative   URINEKETONE  Negative   SG  1.009   UBLD  Negative   URINEPH  7.0   PROTEIN  Negative   NITRITE  Negative   LEUKEST  Negative   RBCU  0   WBCU  <1       I, Donovan Blank, reviewed these laboratory values.    Scribe Disclosure:   I, Sandie Simon, am serving as a scribe; to document services personally performed by Donovan Blank MD -based on data collection and the provider's statements to me.     Provider Disclosure:  I agree with above History, Review of Systems, Physical exam and Plan.  I have reviewed the content of the documentation and have edited it as needed. I have personally performed the services documented here and the documentation accurately represents those services and the decisions I have made.      Electronically signed by:  Donovan Blank MD        Patient Care Team:  Nelly Hamilton NP as PCP - General (Nurse Practitioner - Family)  Colin Blank MD as MD (Urology)  Yajaira Go, NILDA as Registered Nurse  ZAIRA FINE    Copy to patient  MICKY HIDALGO  [address] [facility] [id number]    Answers for HPI/ROS submitted by the patient on 11/17/2017   General Symptoms: No  Skin Symptoms: Yes  HENT Symptoms: Yes  EYE SYMPTOMS: No  HEART SYMPTOMS: No  LUNG SYMPTOMS: Yes  INTESTINAL SYMPTOMS: No  URINARY  SYMPTOMS: No  GYNECOLOGIC SYMPTOMS: No  BREAST SYMPTOMS: No  SKELETAL SYMPTOMS: Yes  BLOOD SYMPTOMS: No  NERVOUS SYSTEM SYMPTOMS: No  MENTAL HEALTH SYMPTOMS: No  Changes in hair: Yes  Changes in moles/birth marks: Yes  Itching: Yes  Rashes: No  Changes in nails: Yes  Acne: Yes  Hair in places you don't want it: Yes  Change in facial hair: Yes  Warts: Yes  Non-healing sores: Yes  Scarring: Yes  Flaking of skin: Yes  Color changes of hands/feet in cold : Yes  Sun sensitivity: Yes  Skin thickening: Yes  Ear pain: No  Ear discharge: No  Hearing loss: No  Tinnitus: No  Nosebleeds: No  Congestion: Yes  Sinus pain: Yes  Trouble swallowing: No   Voice hoarseness: Yes  Mouth sores: No  Sore throat: No  Tooth pain: No  Gum tenderness: No  Bleeding gums: No  Change in taste: No  Change in sense of smell: No  Dry mouth: No  Hearing aid used: No  Neck lump: No  Cough: Yes  Sputum or phlegm: Yes  Coughing up blood: No  Difficulty breating or shortness of breath: Yes  Snoring: No  Wheezing: No  Difficulty breathing on exertion: Yes  Nighttime Cough: Yes  Difficulty breathing when lying flat: No  Back pain: No  Muscle aches: Yes  Neck pain: No  Swollen joints: Yes  Joint pain: Yes  Bone pain: No  Muscle cramps: No  Muscle weakness: No  Joint stiffness: Yes  Bone fracture: Yes    Again, thank you for allowing me to participate in the care of your patient.      Sincerely,    Colin Blank MD

## 2017-11-17 NOTE — TELEPHONE ENCOUNTER
Patient left a voicemail to discuss BP concerns. She had stopped lisinopril on Monday due to side effects (stiffness, muscle aches). When she was seen in urology today, BP was 175/79. Feels a little lightheaded. Has ongoing shortness of breath with exertion, which may be asthma related. Her PCP advised following up with nephrology or going to an urgent care. Patient requesting recommendations.    Message sent to Dr. Waters and Dr. Brownlee for recommendations.    Chuyita Gordon RN

## 2017-11-17 NOTE — TELEPHONE ENCOUNTER
Per Dr. Brownlee:  She was on hctz and can go back on that. Can also start amlodipine 5 and go up to 10 if needed.   Let's do 12.5 thanks     Spoke with patient, who verbalized understanding. Rx sent.    Chuyita Gordon RN

## 2017-11-24 ENCOUNTER — HOSPITAL ENCOUNTER (OUTPATIENT)
Dept: MRI IMAGING | Facility: CLINIC | Age: 62
Discharge: HOME | End: 2017-11-24
Attending: UROLOGY | Admitting: UROLOGY
Payer: COMMERCIAL

## 2017-11-24 DIAGNOSIS — N28.89 RIGHT RENAL MASS: ICD-10-CM

## 2017-11-24 PROCEDURE — 25000128 H RX IP 250 OP 636: Performed by: UROLOGY

## 2017-11-24 PROCEDURE — A9585 GADOBUTROL INJECTION: HCPCS | Performed by: UROLOGY

## 2017-11-24 PROCEDURE — 74183 MRI ABD W/O CNTR FLWD CNTR: CPT

## 2017-11-24 RX ORDER — GADOBUTROL 604.72 MG/ML
7.5 INJECTION INTRAVENOUS ONCE
Status: COMPLETED | OUTPATIENT
Start: 2017-11-24 | End: 2017-11-24

## 2017-11-24 RX ADMIN — GADOBUTROL 6.5 ML: 604.72 INJECTION INTRAVENOUS at 14:10

## 2017-11-24 RX ADMIN — SODIUM CHLORIDE 100 ML: 9 INJECTION, SOLUTION INTRAVENOUS at 14:11

## 2017-11-25 NOTE — PROGRESS NOTES
Ms. Donald Lopes,  The MRI shows that the 1cm lesion in your right kidney may just be a cyst (which typically is not a worrisome lesion) but at this small size it is not definitive.  Thus we will need further follow-up imaging in the future.    The MRI also showed a small pancreas lesion.  This will need follow-up.  I focus primarily on the kidneys so this will likely need to be done with your primary care physician.  I look for to seeing you in January to discuss this further.  Thank you, Donovan Blank.

## 2017-11-30 ENCOUNTER — OFFICE VISIT (OUTPATIENT)
Dept: INTERNAL MEDICINE | Facility: CLINIC | Age: 62
End: 2017-11-30

## 2017-11-30 VITALS
BODY MASS INDEX: 23.41 KG/M2 | RESPIRATION RATE: 18 BRPM | HEIGHT: 63 IN | DIASTOLIC BLOOD PRESSURE: 92 MMHG | SYSTOLIC BLOOD PRESSURE: 148 MMHG | HEART RATE: 84 BPM | TEMPERATURE: 97.6 F | OXYGEN SATURATION: 100 % | WEIGHT: 132.1 LBS

## 2017-11-30 DIAGNOSIS — Z12.31 VISIT FOR SCREENING MAMMOGRAM: ICD-10-CM

## 2017-11-30 DIAGNOSIS — I10 BENIGN ESSENTIAL HYPERTENSION: Primary | ICD-10-CM

## 2017-11-30 DIAGNOSIS — K86.2 PANCREAS CYST: ICD-10-CM

## 2017-11-30 DIAGNOSIS — E87.6 HYPOKALEMIA: ICD-10-CM

## 2017-11-30 DIAGNOSIS — Z11.59 NEED FOR HEPATITIS C SCREENING TEST: ICD-10-CM

## 2017-11-30 DIAGNOSIS — I10 ESSENTIAL HYPERTENSION: ICD-10-CM

## 2017-11-30 DIAGNOSIS — J45.30 MILD PERSISTENT ASTHMA WITHOUT COMPLICATION: ICD-10-CM

## 2017-11-30 DIAGNOSIS — Z00.00 ENCOUNTER FOR ROUTINE ADULT HEALTH EXAMINATION WITHOUT ABNORMAL FINDINGS: ICD-10-CM

## 2017-11-30 LAB
ANION GAP SERPL CALCULATED.3IONS-SCNC: 7 MMOL/L (ref 3–14)
BUN SERPL-MCNC: 10 MG/DL (ref 7–30)
CALCIUM SERPL-MCNC: 9.7 MG/DL (ref 8.5–10.1)
CHLORIDE SERPL-SCNC: 100 MMOL/L (ref 94–109)
CO2 SERPL-SCNC: 31 MMOL/L (ref 20–32)
CREAT SERPL-MCNC: 0.64 MG/DL (ref 0.52–1.04)
GFR SERPL CREATININE-BSD FRML MDRD: >90 ML/MIN/1.7M2
GLUCOSE SERPL-MCNC: 98 MG/DL (ref 70–99)
HCV AB SERPL QL IA: NONREACTIVE
POTASSIUM SERPL-SCNC: 3.5 MMOL/L (ref 3.4–5.3)
SODIUM SERPL-SCNC: 138 MMOL/L (ref 133–144)

## 2017-11-30 RX ORDER — AMLODIPINE BESYLATE 5 MG/1
10 TABLET ORAL DAILY
Qty: 90 TABLET | Refills: 3 | Status: SHIPPED | OUTPATIENT
Start: 2017-11-30 | End: 2017-12-01

## 2017-11-30 ASSESSMENT — PAIN SCALES - GENERAL: PAINLEVEL: NO PAIN (0)

## 2017-11-30 NOTE — NURSING NOTE
Chief Complaint   Patient presents with     Establish Care     Patient is here to establish a new PCP.      Results     Patient is here to go over MRI results.      Melanie Ashby LPN at 9:07 AM on 11/30/2017.

## 2017-11-30 NOTE — PATIENT INSTRUCTIONS
[facility]: [phone]     [facility] Medication Refill Request Information:  * Please contact your pharmacy regarding ANY request for medication refills.  ** [facility] Prescription Fax = [phone]  * Please allow 3 business days for routine medication refills.  * Please allow 5 business days for controlled substance medication refills.     [facility] Test Result notification information:  *You will be notified with in 7-10 days of your appointment day regarding the results of your test.  If you are on MyChart you will be notified as soon as the provider has reviewed the results and signed off on them.    Mammogram Screening Tool    Mammogram   Does patient have a history of breast cancer? no  Does patient have breast implants? no  Reason for mammogram? Routine    MRI Screening Tool    Does the patient have any metals in their body? Yes Right arm  Is the patient claustrophobic? yes  Does the patient need sedation? no  Is the patient able to transport themself to a table? yes

## 2017-11-30 NOTE — MR AVS SNAPSHOT
After Visit Summary   11/30/2017    Delilah Lopes    MRN: [id number]           Patient Information     Date Of Birth          1955        Visit Information        Provider Department      11/30/2017 9:10 AM Jordan Kumar MD [facility]        Today's Diagnoses     Benign essential hypertension    -  1    Need for hepatitis C screening test        Visit for screening mammogram        Essential hypertension        Pancreas cyst          Care Instructions    [facility]: [phone]     Primary Care Center Medication Refill Request Information:  * Please contact your pharmacy regarding ANY request for medication refills.  ** [facility] Prescription Fax = [phone]  * Please allow 3 business days for routine medication refills.  * Please allow 5 business days for controlled substance medication refills.     Primary [facility] Center Test Result notification information:  *You will be notified with in 7-10 days of your appointment day regarding the results of your test.  If you are on MyChart you will be notified as soon as the provider has reviewed the results and signed off on them.    Mammogram Screening Tool    Mammogram   Does patient have a history of breast cancer? no  Does patient have breast implants? no  Reason for mammogram? Routine    MRI Screening Tool    Does the patient have any metals in their body? Yes Right arm  Is the patient claustrophobic? yes  Does the patient need sedation? no  Is the patient able to transport themself to a table? yes                Follow-ups after your visit        Your next 10 appointments already scheduled     Dec 14, 2017  1:50 PM CST   (Arrive by 1:35 PM)   Return Visit with Jordan Kumar MD   [facility] ([facility])    [address] [place] [address] [phone]            Jan 05, 2018  9:45 AM CST   (Arrive by 9:30 AM)   New Patient Visit with Светлана  "Ad Ramírez MD   [facility] Dermatology ([facility] [place])    [address]  3rd [address]  [facility] [address]   [phone]            Jan 19, 2018  9:00 AM CST   (Arrive by 8:45 AM)   Return Visit with Colin Blank MD   [facility] Urology and Inst for Prostate and Urologic Cancers ([facility])    [address]  4th Floor  [facility] [address]   [phone]            Jan 19, 2018 10:15 AM CST   (Arrive by 10:00 AM)   MA SCREENING DIGITAL BILATERAL with [id number]   [facility] Imaging ([facility])    [address]  2nd Floor  [facility] [address] [phone]           Do not use any powder, lotion or deodorant under your arms or on your breast. If you do, we will ask you to remove it before your exam.  Wear comfortable, two-piece clothing.  If you have any allergies, tell your care team.  Bring any previous mammograms from other facilities or have them mailed to the breast center. Three-dimensional (3D) mammograms are available at [facility] locations in [facility], [place], [place], [facility], [place], [place], and [place]. [facility] locations include [place] and [facility] in [place]. Benefits of 3D mammograms include: - Improved rate of cancer detection - Decreases your chance of having to go back for more tests, which means fewer: - \"False-positive\" results (This means that there is an abnormal area but it isn't cancer.) - Invasive testing procedures, such as a biopsy or surgery - Can provide clearer images of the breast if you have dense breast tissue. 3D mammography is an optional exam that anyone can have with a 2D mammogram. It doesn't replace or take the place of a 2D mammogram. 2D mammograms remain an effective screening test for all women.  Not all insurance companies cover the cost of a 3D mammogram. Check with your insurance.            Jun 01, 2018 " 10:00 AM CDT   (Arrive by 9:45 AM)   MR ABDOMEN MRCP W/O & W CONTRAST with [id number]   [facility] MRI ([facility] [place])    [address] [address] [phone]           Take your medicines as usual, unless your doctor tells you not to. Bring a list of your current medicines to your exam (including vitamins, minerals and over-the-counter drugs). Also bring the results of similar scans you may have had.    The day before your exam, drink extra fluids at least six 8-ounce glasses (unless your doctor tells you to restrict your fluids).   Have a blood test (creatinine test) within 30 days of your exam. Go to your clinic or Diagnostic Imaging Department for this test.   Do not eat or drink for 6 hours prior to exam.  The MRI machine uses a strong magnet. Please wear clothes without metal (snaps, zippers). A sweatsuit works well, or we may give you a hospital gown.  Please remove any body piercings and hair extensions before you arrive. You will also remove watches, jewelry, hairpins, wallets, dentures, partial dental plates and hearing aids. You may wear contact lenses, and you may be able to wear your rings. We have a safe place to keep your personal items, but it is safer to leave them at home.   **IMPORTANT** THE INSTRUCTIONS BELOW ARE ONLY FOR THOSE PATIENTS WHO HAVE BEEN TOLD THEY WILL RECEIVE SEDATION OR GENERAL ANESTHESIA DURING THEIR MRI PROCEDURE:  IF YOU WILL RECEIVE SEDATION (take medicine to help you relax during your exam):   You must get the medicine from your doctor before you arrive. Bring the medicine to the exam. Do not take it at home.   Arrive one hour early. Bring someone who can take you home after the test. Your medicine will make you sleepy. After the exam, you may not drive, take a bus or take a taxi by yourself.   No eating 8 hours before your exam. You may have clear liquids up until 4 hours before your exam. (Clear liquids  include water, clear tea, black coffee and fruit juice without pulp.)  IF YOU WILL RECEIVE ANESTHESIA (be asleep for your exam):   Arrive 1 1/2 hours early. Bring someone who can take you home after the test. You may not drive, take a bus or take a taxi by yourself.   No eating 8 hours before your exam. You may have clear liquids up until 4 hours before your exam. (Clear liquids include water, clear tea, black coffee and fruit juice without pulp.)  If you have any questions, please contact your Imaging Department exam site.              Future tests that were ordered for you today     Open Future Orders        Priority Expected Expires Ordered    MRI Abdomen MRCP w/o & w contrast Routine 6/28/2018 11/30/2018 11/30/2017    MA SCREENING DIGITAL BILAT - Future  (s+30) Routine  11/30/2018 11/30/2017            Who to contact     Please call your clinic at [phone] to:    Ask questions about your health    Make or cancel appointments    Discuss your medicines    Learn about your test results    Speak to your doctor   If you have compliments or concerns about an experience at your clinic, or if you wish to file a complaint, please contact [facility] Patient Relations at [phone] or email us at [email or web address]         Additional Information About Your Visit        [employer] Information     [employer] gives you secure access to your electronic health record. If you see a primary care provider, you can also send messages to your care team and make appointments. If you have questions, please call your primary care clinic.  If you do not have a primary care provider, please call [phone] and they will assist you.      [employer] is an electronic gateway that provides easy, online access to your medical records. With [employer], you can request a clinic appointment, read your test results, renew a prescription or communicate with your care team.     To access your existing account,  "please contact your [facility] or call [phone] for assistance.        Care EveryWhere ID     This is your Care EveryWhere ID. This could be used by other organizations to access your [facility] medical records  [id number]        Your Vitals Were     Pulse Temperature Respirations Height Last Period Pulse Oximetry    84 97.6  F (36.4  C) (Oral) 18 1.608 m (5' 3.31\") 02/12/2006 100%    Breastfeeding? BMI (Body Mass Index)                No 23.17 kg/m2           Blood Pressure from Last 3 Encounters:   11/30/17 (!) 148/92   11/17/17 (!) 175/97   10/03/17 143/89    Weight from Last 3 Encounters:   11/30/17 59.9 kg (132 lb 1.6 oz)   11/17/17 62.1 kg (136 lb 14.4 oz)   10/03/17 61.9 kg (136 lb 8 oz)                 Today's Medication Changes          These changes are accurate as of: 11/30/17 11:48 AM.  If you have any questions, ask your nurse or doctor.               These medicines have changed or have updated prescriptions.        Dose/Directions    amLODIPine 5 MG tablet   Commonly known as:  NORVASC   This may have changed:  how much to take   Used for:  Essential hypertension   Changed by:  Jordan Kumar MD        Dose:  10 mg   Take 2 tablets (10 mg) by mouth daily   Quantity:  90 tablet   Refills:  3            Where to get your medicines      These medications were sent to [facility]/pharmacy #[id number] - [place], [place] - [address]  [address] [facility] [address]    Hours:  [facility] Phone:  [phone]     amLODIPine 5 MG tablet                Primary Care Provider Office Phone # Fax #    Nelly Hamilton [phone]       [facility] [address]  [facility] [address]        Equal Access to Services     RAF CR AH: Hadii aad ku hadasho Soomaali, waaxda luqadaha, qaybta kaalmada adeegyada, waxay armaan murray. So [facility] [phone].    ATENCIÓN: Si habla dariusañol, tiene a chakraborty disposición servicios " bradley de asistencia lingüística. Gisselle lawson [phone].    We comply with applicable federal civil rights laws and [place] laws. We do not discriminate on the basis of race, color, national origin, age, disability, sex, sexual orientation, or gender identity.            Thank you!     Thank you for choosing [facility]  for your care. Our goal is always to provide you with excellent care. Hearing back from our patients is one way we can continue to improve our services. Please take a few minutes to complete the written survey that you may receive in the mail after your visit with us. Thank you!             Your Updated Medication List - Protect others around you: Learn how to safely use, store and throw away your medicines at www.disposemymeds.org.          This list is accurate as of: 11/30/17 11:48 AM.  Always use your most recent med list.                   Brand Name Dispense Instructions for use Diagnosis    albuterol 108 (90 BASE) MCG/ACT Inhaler    PROAIR HFA    1 Inhaler    Inhale 2 puffs into the lungs every 6 hours as needed for shortness of breath / dyspnea or wheezing    Mild persistent asthma       amLODIPine 5 MG tablet    NORVASC    90 tablet    Take 2 tablets (10 mg) by mouth daily    Essential hypertension       amoxicillin 500 MG capsule    AMOXIL     TAKE 2 CAPSULE BY MOUTH THREE TIMES A DAY AS DIRECTED        ASMANEX 30 METERED DOSES 110 MCG/INH inhaler   Generic drug:  mometasone      INHALE ONE PUFF DAILY FOR ASTHMA, USE WHEN OUT OF TOWN AND NEBULIZER NOT AVAILABLE        azithromycin 250 MG tablet    ZITHROMAX     TAKE 2 TABLETS BY MOUTH TODAY, THEN TAKE 1 TABLET DAILY FOR 4 DAYS        B COMPLEX PO      1 tablet orally at bedtime        clindamycin 1 % Swab    CLEOCIN T     USE 1 SWAB TOPICALLY DAILY        FISH OIL PO      Take 2 capsules by mouth At Bedtime        hydrochlorothiazide 12.5 MG capsule    MICROZIDE    90 capsule    Take 1 capsule (12.5 mg) by mouth daily     Essential hypertension       Multi-vitamin Tabs tablet   Generic drug:  multivitamin, therapeutic with minerals      1 TABLET DAILY at bedtime        triamcinolone 0.1 % cream    KENALOG     APPLY TWICE A DAY AS NEEDED TO AFFECTED AREA        Vitamin C 100 MG Tabs      Take 1,500 mg by mouth At Bedtime        * VITAMIN D (CHOLECALCIFEROL) PO      Take 2,500 Units by mouth At Bedtime        * Vitamin D3 400 UNITS Caps      Take 2,500 Units by mouth        vitamin E 400 UNIT capsule           * Notice:  This list has 2 medication(s) that are the same as other medications prescribed for you. Read the directions carefully, and ask your doctor or other care provider to review them with you.

## 2017-11-30 NOTE — PROGRESS NOTES
"  PRIMARY CARE CENTER         HPI:       Delilah Lopes is a 62 year old female who presents for the following  Patient presents with: Establish Care (Patient is here to establish a new PCP. ) and Results (Patient is here to go over MRI results. )    Has hx of mild persistent asthma. Stopped taking Asmanex 1 week ago, did not think that it was helping. Uses albuterol 1x/week, no hospitalizations, no nighttime symptoms unless URI. Triggers are smoke and cats.    Seen by nephrology 10/4 noted to have hypokalemia, held HCTZ, repeat K normal. Resumed her HCTZ. Notes a hx of \"joint pain\" in response to lisinopril.    Problem, Medication and Allergy Lists were reviewed and are current.  Patient is an established patient of this clinic.         Review of Systems:   ROS 10 point reviewed, negative except as per HPI           Physical Exam:   BP (!) 148/92  Pulse 84  Temp 97.6  F (36.4  C) (Oral)  Resp 18  Ht 1.608 m (5' 3.31\")  Wt 59.9 kg (132 lb 1.6 oz)  LMP 02/12/2006  SpO2 100%  Breastfeeding? No  BMI 23.17 kg/m2  Body mass index is 23.17 kg/(m^2).  Vitals were reviewed       GENERAL APPEARANCE: healthy, alert and no distress     EYES: EOMI, PERRL     HENT: ear canals and TM's normal and nose and mouth without ulcers or lesions     NECK: no adenopathy, no asymmetry, masses, or scars and thyroid normal to palpation     RESP: lungs clear to auscultation - no rales, rhonchi or wheezes     CV: regular rates and rhythm, normal S1 S2, no S3 or S4 and no murmur, click or rub     ABDOMEN:  soft, nontender, no HSM or masses and bowel sounds normal     MS: extremities normal- no gross deformities noted, no evidence of inflammation in joints, FROM in all extremities.     SKIN: no suspicious lesions or rashes     NEURO: Normal strength and tone, sensory exam grossly normal, mentation intact and speech normal     PSYCH: mentation appears normal. and affect normal/bright        Results:     Recent lab results " reviewed  Assessment and Plan     Delilah was seen today for establish care and results.    Diagnoses and all orders for this visit:    Essential hypertension  Hypokalemia  Patient's BP slightly above goal today. Will increase amlodipine due to recent hypokalemia which resolved after stopping HCTZ. As patient has since restarted HCTZ, will recheck BMP to ensure continued resolution of hypokalemia  -     Basic metabolic panel; Future  -     amLODIPine (NORVASC) 5 MG tablet; Take 2 tablets (10 mg) by mouth daily  - 2 wk f/u for BP recheck    Pancreas cyst  Reviewed findings of 2 mm dilated side branch vs IPMN with patient. Rec for f/u MRI/MRCP in 6-12 months.  -     MRI Abdomen MRCP w/o & w contrast; Future    Mild persistent asthma without complication  Patient will resume taking her Asmanex. Her symptoms are more consistent with intermittent asthma than the diagnosis of mild persistent asthma that she carries (1x/wk short acting inhalers, no nocturnal symptoms, no hospitalizations, PFTs at [place]). If so, she may not need a controller medication, need to review PFTs from [place]. Triggers are cats, URI, and smoke.   - Obtain PFTs from [place] to review   - Resume Asmanex   - Continue to avoid triggers as possible    Encounter for routine [facility] health examination without abnormal findings  Visit for screening mammogram  Need for hepatitis C screening test  -     Hepatitis C Screen Reflex to HCV RNA Quant and Genotype; Future  -     MA SCREENING DIGITAL BILAT - Future  (s+30); Future  - States that she had a normal colonoscopy in 2008 done at [facility], requested that records be sent  - Tdap 2/15/11  - Will address mammogram at follow up  - Declines flu shot    Options for treatment and follow-up care were reviewed with the patient. Delilah Lopes engaged in the decision making process and verbalized understanding of the options discussed and agreed with the final plan.    Jordan Kumar MD  Nov 30, 2017    Pt was seen and  plan of care discussed with Dr Sheikh.     Attestation:  I, Blaire Hayes, saw this patient with the resident and agree with the resident s findings and plan of care as documented in the resident s note.      Blaire Hayes MD

## 2017-12-01 ENCOUNTER — CARE COORDINATION (OUTPATIENT)
Dept: NEPHROLOGY | Facility: CLINIC | Age: 62
End: 2017-12-01

## 2017-12-01 DIAGNOSIS — I10 ESSENTIAL HYPERTENSION: ICD-10-CM

## 2017-12-01 DIAGNOSIS — R30.0 DYSURIA: Primary | ICD-10-CM

## 2017-12-01 DIAGNOSIS — R30.0 DYSURIA: ICD-10-CM

## 2017-12-01 LAB
BACTERIA #/AREA URNS HPF: ABNORMAL /HPF
RBC #/AREA URNS AUTO: ABNORMAL /HPF
URNS CMNT MICRO: YELLOW
WBC #/AREA URNS AUTO: ABNORMAL /HPF

## 2017-12-01 PROCEDURE — 81015 MICROSCOPIC EXAM OF URINE: CPT | Performed by: FAMILY MEDICINE

## 2017-12-01 RX ORDER — LISINOPRIL 5 MG/1
5 TABLET ORAL DAILY
Qty: 90 TABLET | Refills: 3 | Status: SHIPPED | OUTPATIENT
Start: 2017-12-01 | End: 2018-11-11

## 2017-12-01 RX ORDER — AMLODIPINE BESYLATE 5 MG/1
10 TABLET ORAL DAILY
Qty: 180 TABLET | Refills: 3 | Status: SHIPPED | OUTPATIENT
Start: 2017-12-01 | End: 2017-12-01 | Stop reason: ALTCHOICE

## 2017-12-01 NOTE — PROGRESS NOTES
Reason for Call    Patient left a voicemail to discuss BP and UTI concerns. She saw PCP yesterday and was advised to increase amlodipine dose. She's had daily headaches since starting amlodipine, and worries that this will get worse. Unsure if she needs to try a different medication or just wait it out.    Also notes UTI symptoms started as soon as she left the clinic yesterday. Reports burning sensation and urgency. She took a dose of Urostat yesterday evening to help get her through the night. Requesting recommendations.    Collaboration    Above discussed with Dr. Waters and Dr. Sheikh.  Orders received.    She didn't mention the headaches to us at all. But, we'd appreciate any other recommendations for BP control. As far as the UTI symptoms, rec she get a UA/UC.     Please tell her to stop HCTZ and Amlodipine.  We will start her on Lisinopril 5 mg daily instead. Lisinopril dose can be titrate it up by 5 mg if her BP is persistently >140/90 mmHg       Plan    1. Go to lab for UA/UC  2. Adjust BP meds as directed and follow up in 1 week    Patient was given an opportunity to ask questions and have those questions answered to her satisfaction.  Patient verbalized understanding of instructions provided and agreed to plan of care.    Chuyita Gordon RN

## 2017-12-08 ENCOUNTER — CARE COORDINATION (OUTPATIENT)
Dept: NEPHROLOGY | Facility: CLINIC | Age: 62
End: 2017-12-08

## 2017-12-08 NOTE — PROGRESS NOTES
Patient left a voicemail stating she hasn't checked her BP, but is feeling much better. She will check over the weekend and call if any issues.    Chuyita Gordon RN

## 2017-12-14 ENCOUNTER — OFFICE VISIT (OUTPATIENT)
Dept: INTERNAL MEDICINE | Facility: CLINIC | Age: 62
End: 2017-12-14
Payer: COMMERCIAL

## 2017-12-14 VITALS
SYSTOLIC BLOOD PRESSURE: 135 MMHG | BODY MASS INDEX: 23.8 KG/M2 | RESPIRATION RATE: 20 BRPM | DIASTOLIC BLOOD PRESSURE: 84 MMHG | WEIGHT: 135.65 LBS | OXYGEN SATURATION: 97 % | HEART RATE: 72 BPM

## 2017-12-14 DIAGNOSIS — I10 ESSENTIAL HYPERTENSION: ICD-10-CM

## 2017-12-14 DIAGNOSIS — Z00.00 ENCOUNTER FOR ROUTINE ADULT HEALTH EXAMINATION WITHOUT ABNORMAL FINDINGS: ICD-10-CM

## 2017-12-14 DIAGNOSIS — J45.30 MILD PERSISTENT ASTHMA WITHOUT COMPLICATION: Primary | ICD-10-CM

## 2017-12-14 LAB
ANION GAP SERPL CALCULATED.3IONS-SCNC: 6 MMOL/L (ref 3–14)
BUN SERPL-MCNC: 10 MG/DL (ref 7–30)
CALCIUM SERPL-MCNC: 8.8 MG/DL (ref 8.5–10.1)
CHLORIDE SERPL-SCNC: 105 MMOL/L (ref 94–109)
CO2 SERPL-SCNC: 28 MMOL/L (ref 20–32)
CREAT SERPL-MCNC: 0.74 MG/DL (ref 0.52–1.04)
GFR SERPL CREATININE-BSD FRML MDRD: 80 ML/MIN/1.7M2
GLUCOSE SERPL-MCNC: 92 MG/DL (ref 70–99)
POTASSIUM SERPL-SCNC: 3.5 MMOL/L (ref 3.4–5.3)
SODIUM SERPL-SCNC: 140 MMOL/L (ref 133–144)

## 2017-12-14 ASSESSMENT — PAIN SCALES - GENERAL: PAINLEVEL: NO PAIN (0)

## 2017-12-14 NOTE — PATIENT INSTRUCTIONS
[facility]: [phone]     [facility] Medication Refill Request Information:  * Please contact your pharmacy regarding ANY request for medication refills.  ** [facility] Prescription Fax = [phone]  * Please allow 3 business days for routine medication refills.  * Please allow 5 business days for controlled substance medication refills.     [facility] Test Result notification information:  *You will be notified with in 7-10 days of your appointment day regarding the results of your test.  If you are on MyChart you will be notified as soon as the provider has reviewed the results and signed off on them.

## 2017-12-14 NOTE — MR AVS SNAPSHOT
After Visit Summary   12/14/2017    Delilah Lopes    MRN: [id number]           Patient Information     Date Of Birth          1955        Visit Information        Provider Department      12/14/2017 1:50 PM Jordan Kumar MD [facility]        Today's Diagnoses     Mild persistent asthma without complication    -  1    Essential hypertension        Encounter for routine adult health examination without abnormal findings          Care Instructions    Primary Care Center: [phone]     Primary Care Center Medication Refill Request Information:  * Please contact your pharmacy regarding ANY request for medication refills.  ** [facility] Prescription Fax = [phone]  * Please allow 3 business days for routine medication refills.  * Please allow 5 business days for controlled substance medication refills.     Primary Care Center Test Result notification information:  *You will be notified with in 7-10 days of your appointment day regarding the results of your test.  If you are on MyChart you will be notified as soon as the provider has reviewed the results and signed off on them.          Follow-ups after your visit        Follow-up notes from your care team     Return in about 4 months (around 4/14/2018).      Your next 10 appointments already scheduled     Jan 05, 2018  9:45 AM CST   (Arrive by 9:30 AM)   New Patient Visit with Светлана Ramírez MD   [facility] ([facility])    [address] [place] [id number]   [phone]            Jan 19, 2018  9:00 AM CST   (Arrive by 8:45 AM)   Return Visit with Colin Blank MD   [facility] ([facility])    [address] [place] [id number]   [phone]            Jan 19, 2018 10:15 AM CST   (Arrive by 10:00 AM)   MA SCREENING DIGITAL BILATERAL with [id number]   [facility]  "[facility] ([facility])    [address] [place] [address]  [address] [facility] [id number] [phone]           Do not use any powder, lotion or deodorant under your arms or on your breast. If you do, we will ask you to remove it before your exam.  Wear comfortable, two-piece clothing.  If you have any allergies, tell your care team.  Bring any previous mammograms from other facilities or have them mailed to the breast center. Three-dimensional (3D) mammograms are available at [place] locations in [facility], [facility], [facility], and [place]. [facility] locations include [place] and [facility] [place] [facility] in [place]. Benefits of 3D mammograms include: - Improved rate of cancer detection - Decreases your chance of having to go back for more tests, which means fewer: - \"False-positive\" results (This means that there is an abnormal area but it isn't cancer.) - Invasive testing procedures, such as a biopsy or surgery - Can provide clearer images of the breast if you have dense breast tissue. 3D mammography is an optional exam that anyone can have with a 2D mammogram. It doesn't replace or take the place of a 2D mammogram. 2D mammograms remain an effective screening test for all women.  Not all insurance companies cover the cost of a 3D mammogram. Check with your insurance.            Apr 05, 2018  1:00 PM CDT   (Arrive by 12:45 PM)   Return Visit with Jordan Kumar MD   [facility] ([facility])    [address] [place] [address]  4th Floor  [facility] [id number] [phone]            Jun 01, 2018 10:00 AM CDT   (Arrive by 9:45 AM)   MR ABDOMEN MRCP W/O & W CONTRAST with [address] [facility] [place] [facility] ([facility])    [address] [place] [address]  [address] [facility] [id number] [phone]           Take your medicines as usual, unless your doctor tells you not to. " Bring a list of your current medicines to your exam (including vitamins, minerals and over-the-counter drugs). Also bring the results of similar scans you may have had.    The day before your exam, drink extra fluids at least six 8-ounce glasses (unless your doctor tells you to restrict your fluids).   Have a blood test (creatinine test) within 30 days of your exam. Go to your clinic or Diagnostic Imaging Department for this test.   Do not eat or drink for 6 hours prior to exam.  The MRI machine uses a strong magnet. Please wear clothes without metal (snaps, zippers). A sweatsuit works well, or we may give you a hospital gown.  Please remove any body piercings and hair extensions before you arrive. You will also remove watches, jewelry, hairpins, wallets, dentures, partial dental plates and hearing aids. You may wear contact lenses, and you may be able to wear your rings. We have a safe place to keep your personal items, but it is safer to leave them at home.   **IMPORTANT** THE INSTRUCTIONS BELOW ARE ONLY FOR THOSE PATIENTS WHO HAVE BEEN TOLD THEY WILL RECEIVE SEDATION OR GENERAL ANESTHESIA DURING THEIR MRI PROCEDURE:  IF YOU WILL RECEIVE SEDATION (take medicine to help you relax during your exam):   You must get the medicine from your doctor before you arrive. Bring the medicine to the exam. Do not take it at home.   Arrive one hour early. Bring someone who can take you home after the test. Your medicine will make you sleepy. After the exam, you may not drive, take a bus or take a taxi by yourself.   No eating 8 hours before your exam. You may have clear liquids up until 4 hours before your exam. (Clear liquids include water, clear tea, black coffee and fruit juice without pulp.)  IF YOU WILL RECEIVE ANESTHESIA (be asleep for your exam):   Arrive 1 1/2 hours early. Bring someone who can take you home after the test. You may not drive, take a bus or take a taxi by yourself.   No eating 8 hours before your exam. You  may have clear liquids up until 4 hours before your exam. (Clear liquids include water, clear tea, black coffee and fruit juice without pulp.)  If you have any questions, please contact your Imaging Department exam site.              Future tests that were ordered for you today     Open Future Orders        Priority Expected Expires Ordered    Basic metabolic panel Routine 12/14/2017 12/28/2017 12/14/2017            Who to contact     Please call your clinic at [phone] to:    Ask questions about your health    Make or cancel appointments    Discuss your medicines    Learn about your test results    Speak to your doctor   If you have compliments or concerns about an experience at your clinic, or if you wish to file a complaint, please contact [facility] Patient Relations at [phone] or email us at [email or web address]         Additional Information About Your Visit        [email or web address] Information     [employer] gives you secure access to your electronic health record. If you see a primary care provider, you can also send messages to your care team and make appointments. If you have questions, please call your primary care clinic.  If you do not have a primary care provider, please call [phone] and they will assist you.      [employer] is an electronic gateway that provides easy, online access to your medical records. With [employer], you can request a clinic appointment, read your test results, renew a prescription or communicate with your care team.     To access your existing account, please contact your [facility] Clinic or call [phone] for assistance.        Care EveryWhere ID     This is your Care EveryWhere ID. This could be used by other organizations to access your [facility] medical records  [id number]        Your Vitals Were     Pulse Respirations Last Period Pulse Oximetry BMI (Body Mass Index)       72 20 02/12/2006 97% 23.8 kg/m2         Blood Pressure from Last 3 Encounters:   12/14/17 135/84   11/30/17 (!) 148/92   11/17/17 (!) 175/97    Weight from Last 3 Encounters:   12/14/17 61.5 kg (135 lb 10.4 oz)   11/30/17 59.9 kg (132 lb 1.6 oz)   11/17/17 62.1 kg (136 lb 14.4 oz)                 Today's Medication Changes          These changes are accurate as of: 12/14/17  2:47 PM.  If you have any questions, ask your nurse or doctor.               Start taking these medicines.        Dose/Directions    mometasone 220 MCG/INH Inhaler   Commonly known as:  ASMANEX 30 METERED DOSES   Used for:  Mild persistent asthma without complication   Replaces:  ASMANEX 30 METERED DOSES 110 MCG/INH inhaler   Started by:  Jordan Kumar MD        Dose:  1 puff   Inhale 1 puff into the lungs daily   Quantity:  3 Inhaler   Refills:  3         Stop taking these medicines if you haven't already. Please contact your care team if you have questions.     ASMANEX 30 METERED DOSES 110 MCG/INH inhaler   Generic drug:  mometasone   Replaced by:  mometasone 220 MCG/INH Inhaler   Stopped by:  Jordan Kumar MD                Where to get your medicines      These medications were sent to [facility]/pharmacy #[id number] - [place], [place] - [address] [facility]  [address] [facility] [address]    Hours:  [facility] to [facility] Phone:  [phone]     mometasone 220 MCG/INH Inhaler                Primary Care Provider Office Phone # Fax #    Jordan Kumar [phone]       [address] [address]        Equal Access to Services     KELLY CR AH: Hadii ann frias Soashley, waaxda luqadaha, qaybta kaalmada adeegyada, kellie murray. So [facility] [phone].    ATENCIÓN: Si habla español, tiene a chakraborty disposición servicios gratuitos de asistencia lingüística. Llame al [phone].    We comply with applicable federal civil rights laws and [place] laws. We do not discriminate on the basis of race,  color, national origin, age, disability, sex, sexual orientation, or gender identity.            Thank you!     Thank you for choosing [facility]  for your care. Our goal is always to provide you with excellent care. Hearing back from our patients is one way we can continue to improve our services. Please take a few minutes to complete the written survey that you may receive in the mail after your visit with us. Thank you!             Your Updated Medication List - Protect others around you: Learn how to safely use, store and throw away your medicines at www.disposemymeds.org.          This list is accurate as of: 12/14/17  2:47 PM.  Always use your most recent med list.                   Brand Name Dispense Instructions for use Diagnosis    albuterol 108 (90 BASE) MCG/ACT Inhaler    PROAIR HFA    1 Inhaler    Inhale 2 puffs into the lungs every 6 hours as needed for shortness of breath / dyspnea or wheezing    Mild persistent asthma       B COMPLEX PO      1 tablet orally at bedtime        clindamycin 1 % Swab    CLEOCIN T     USE 1 SWAB TOPICALLY DAILY as needed..Helen Go LPN 1:46 PM on 12/14/2017        FISH OIL PO      Take 2 capsules by mouth At Bedtime        lisinopril 5 MG tablet    PRINIVIL/ZESTRIL    90 tablet    Take 1 tablet (5 mg) by mouth daily    Essential hypertension       mometasone 220 MCG/INH Inhaler    ASMANEX 30 METERED DOSES    3 Inhaler    Inhale 1 puff into the lungs daily    Mild persistent asthma without complication       Multi-vitamin Tabs tablet   Generic drug:  multivitamin, therapeutic with minerals      1 TABLET DAILY at bedtime        Vitamin C 100 MG Tabs      Take 1,500 mg by mouth At Bedtime        VITAMIN C PO      Take 1,500 mg by mouth daily        * VITAMIN D (CHOLECALCIFEROL) PO      Take 2,500 Units by mouth At Bedtime        * Vitamin D3 400 UNITS Caps      Take 2,500 Units by mouth        * Notice:  This list has 2 medication(s) that are the same  as other medications prescribed for you. Read the directions carefully, and ask your doctor or other care provider to review them with you.

## 2017-12-14 NOTE — NURSING NOTE
Chief Complaint   Patient presents with     Hypertension     follow up hypertension   Helen Go LPN 1:51 PM on 12/14/2017  Rooming Note  Health Maintenance   Health Maintenance Due   Topic Date Due     ASTHMA ACTION PLAN Q1 YR  12/15/2006     ADVANCE DIRECTIVE PLANNING Q5 YRS  10/04/2011     MAMMO SCREEN Q2 YR (SYSTEM ASSIGNED)  02/25/2016     ASTHMA CONTROL TEST Q6 MOS  08/08/2017    All health maintenance items discussed and pended.  Helen Go LPN 1:55 PM on 12/14/2017

## 2017-12-14 NOTE — PROGRESS NOTES
PRIMARY CARE CENTER       SUBJECTIVE:  Delilah Lopes is a 62 year old female with a PMHx of mild persistent asthma, HTN, OA who comes in for BP recheck.    Started on lisinopril in the interim. This was the original plan of her nephrologist. Stopped taking amlodipine and HCTZ. Has noted stiffness since starting lisinopril which is manageable.    Continues to note shortnss of breath with exertion off and on for years. Resumed Asmanex after last clinic visit, no change in symptoms. Some of patient's triggers are unavoidable (visits homes with mold as SW). Still uses albuterol only when exposed to triggers. Still feels that air is difficult to get out. Taking 110 mcg asmanex daily. Needs refill.    Medications and allergies reviewed by me today.     ROS:   7 point ROS negative except as per HPI    OBJECTIVE:    /84 (BP Location: Right arm, Patient Position: Sitting, Cuff Size: Adult Regular)  Pulse 72  Resp 20  Wt 61.5 kg (135 lb 10.4 oz)  LMP 02/12/2006  SpO2 97%  BMI 23.8 kg/m2   Wt Readings from Last 1 Encounters:   12/14/17 61.5 kg (135 lb 10.4 oz)       Gen: Pleasant, well-developed, well-nourished and in no apparent distress  HEENT: normocephalic, atraumatic, EOMI, no scleral icterus  CV: regular rate and rhythm, normal S1 S2, no S3 or S4 and no murmur, click, or rub  Resp: clear to ausculation bilaterally, no increased WOB  Abd: bowel sounds presents, soft. non-tender, non-distended, no masses or hepatosplenomegaly  Ext: WWP, no LE edema  Skin: warm and dry, no rashes or ecchymoses on exposed skin  Neuro: alert and oriented, normal gait, speech fluent  Psych: normal mood, normal affect     ASSESSMENT/PLAN:    Delilah was seen today for hypertension.    Diagnoses and all orders for this visit:    Mild persistent asthma without complication  Patient taking mometasone 110 mcg daily, initial starting dose is 220 mcg daily. Will increase her dose to attempt to control her  symptoms as some triggers such as mold are not avoidable due to her job as a .  -     mometasone (ASMANEX 30 METERED DOSES) 220 MCG/INH Inhaler; Inhale 1 puff into the lungs daily    Essential hypertension  Patient's BP controlled on lisinopril 5 mg. New start, will check BMP. Patient has stopped amlodipine and HCTZ.  -     Basic metabolic panel; Future    Encounter for routine adult health examination without abnormal findings  -     Hep C negative  -     Mammogram scheduled for 1/19/2018  -     States that she had a normal colonoscopy in 2008 done at [facility], requested that records be sent  -     Tdap 2/15/11  -      PNA 23 in 2012. Will need PNA 13 at age 66   -      Declined flu shot this year    Pt should return to clinic for f/u with me in 4 month     Jordan Kumar MD  Dec 14, 2017     Plan of care discussed with Dr Fierro.     Thom Kumar MD  Internal Medicine, PGY-2  Pager [phone]    While the patient was in clinic, I reviewed the pertinent medical history and results.  I discussed the current findings on physical examination, as well as the patient s diagnosis and treatment plan with the resident and agree with the information as documented with the following exceptions: none.  Madeleine Fierro MD

## 2017-12-29 ENCOUNTER — CARE COORDINATION (OUTPATIENT)
Dept: NEPHROLOGY | Facility: CLINIC | Age: 62
End: 2017-12-29

## 2018-01-05 DIAGNOSIS — J45.30 MILD PERSISTENT ASTHMA: ICD-10-CM

## 2018-01-05 NOTE — PROGRESS NOTES
Patient left a voicemail stating she's doing well. Hasn't been checking BP, but will start again (got a new machine). Denied any physical symptoms or concerns.    Chuyita Gordon RN

## 2018-01-08 NOTE — TELEPHONE ENCOUNTER
albuterol (ALBUTEROL)       Last Written Prescription Date:  3/11/15  Last Fill Quantity: 1INH,   # refills: 1  Last Office Visit : 12/14/17  Future Office visit:  4/5/18    Routing refill request to provider for review/approval because:  LAST RF 2015    ACT

## 2018-01-16 ENCOUNTER — PRE VISIT (OUTPATIENT)
Dept: UROLOGY | Facility: CLINIC | Age: 63
End: 2018-01-16

## 2018-01-18 RX ORDER — ALBUTEROL SULFATE 90 UG/1
2 AEROSOL, METERED RESPIRATORY (INHALATION) EVERY 6 HOURS PRN
Qty: 1 INHALER | Refills: 1 | Status: SHIPPED | OUTPATIENT
Start: 2018-01-18 | End: 2019-01-02

## 2018-01-19 ENCOUNTER — OFFICE VISIT (OUTPATIENT)
Dept: UROLOGY | Facility: CLINIC | Age: 63
End: 2018-01-19
Payer: COMMERCIAL

## 2018-01-19 ENCOUNTER — RADIANT APPOINTMENT (OUTPATIENT)
Dept: MAMMOGRAPHY | Facility: CLINIC | Age: 63
End: 2018-01-19
Attending: INTERNAL MEDICINE
Payer: COMMERCIAL

## 2018-01-19 VITALS
SYSTOLIC BLOOD PRESSURE: 141 MMHG | DIASTOLIC BLOOD PRESSURE: 90 MMHG | HEART RATE: 73 BPM | HEIGHT: 63 IN | WEIGHT: 137.4 LBS | BODY MASS INDEX: 24.34 KG/M2

## 2018-01-19 DIAGNOSIS — N28.1 ACQUIRED CYST OF KIDNEY: Primary | ICD-10-CM

## 2018-01-19 DIAGNOSIS — Z12.31 VISIT FOR SCREENING MAMMOGRAM: ICD-10-CM

## 2018-01-19 ASSESSMENT — PAIN SCALES - GENERAL: PAINLEVEL: NO PAIN (0)

## 2018-01-19 NOTE — LETTER
1/19/2018       RE: Delilah Lopes  [address] [facility] [address]     Dear Colleague,    Thank you for referring your patient, Delilah Lopes, to the [facility] UROLOGY AND INST FOR PROSTATE AND UROLOGIC CANCERS at [facility]. Please see a copy of my visit note below.    ASSESSMENT AND PLAN  Right Renal Cyst 1.1cm  - MRI from 11/24/17 demonstrated renal cyst. Follow up with me as needed.     Pancreatic Ductal Dilation/Distal Pancreatic Ductal Irregularity:  - Follow up with PCP  _______________________________________________________________________    CHIEF COMPLAINT  It was my pleasure to see Delilah Lopes who is a 62 year old female for evaluation of renal mass    HPI   She has had a long standing hemorrhagic cyst for 5 years. She had a hysterectomy in 2011 and her ureter was nicked in the process.  She had a stent placed for the ureteral injury which was subsequently removed. Her cyst was discovered after that. In 2015 she was at [facility] where they recommended observation of the cyst.      She returns to clinic today for follow up after MRI on 11/24/17. She has been doing well since then. Of note, she experienced symptoms of a bladder infection following MRI due to contrast. Subsequent UA did not demonstrate evidence of infection.     Pancreatic ductal dilation and distal pancreatic ductal irregularity were incidentally discovered on MRI from 11/2017. She is following up with her PCP for this and is scheduled to have a repeat MRI in June.       RADIOLOGIC IMAGING  11/24/17 MRI Abdomen With and Without Contrast  1. In this patient with a reported history of a 1 cm right renal mass,  there is a 1 cm exophytic right lower pole lesion most consistent with  renal cyst. No convincing evidence of an enhancing soft tissue  component. Comparison with the outside ultrasound 12/20/2016 and  outside CT from  would be helpful if available.     2.  Mild pancreatic ductal dilatation and distal pancreatic ductal  irregularity as well as a 2 mm dilated side branch versus side branch  IPMN. Institutional follow-up recommendations for pancreatic cysts  below.     3. Scattered hepatic cysts.   I reviewed the recent radiologic imaging and reports described above.    Patient Active Problem List    Diagnosis Date Noted     Essential hypertension 2015     Priority: Medium     CARDIOVASCULAR SCREENING; LDL GOAL LESS THAN 160 10/31/2010     Priority: Medium     Cold sore 2009     Priority: Medium     Mild persistent asthma 2006     Priority: Medium     Stricture and stenosis of esophagus 2002     Priority: Medium     Urethral stricture 2002     Priority: Medium     Problem list name updated by automated process. Provider to review       Past Medical History:   Diagnosis Date     Essential hypertension 2015     Melanoma (H) 2016     Mild persistent asthma      Osteoarthritis      Stricture and stenosis of esophagus     Dx , [place] [facility]     Urethral stricture unspecified      Past Surgical History:   Procedure Laterality Date     C NONSPECIFIC PROCEDURE       x3 ,,     HERNIA REPAIR      2 hernia repairs     HYSTERECTOMY, PAP NO LONGER INDICATED       STENT  2017    kidney     SURGICAL PATHOLOGY EXAM  2017     Current Outpatient Prescriptions   Medication Sig Dispense Refill     ASMANEX 30 METERED DOSES 110 MCG/INH inhaler INHALE 1 PUFF ONCE DAILY FOR ASTHMA UTILIZE WHEN OUT OF TOWN AND NEBULIZER IS NOT AVAILABLE  4     albuterol (VENTOLIN HFA) 108 (90 BASE) MCG/ACT Inhaler Inhale 2 puffs into the lungs every 6 hours as needed for shortness of breath / dyspnea 1 Inhaler 1     Ascorbic Acid (VITAMIN C PO) Take 1,500 mg by mouth daily       mometasone (ASMANEX 30 METERED DOSES) 220 MCG/INH Inhaler Inhale 1 puff into the lungs daily 3 Inhaler 3     lisinopril (PRINIVIL/ZESTRIL) 5 MG tablet Take 1  "tablet (5 mg) by mouth daily 90 tablet 3     clindamycin (CLEOCIN T) 1 % SWAB USE 1 SWAB TOPICALLY DAILY as needed..Helen Donavan ABRAMS 1:46 PM on 12/14/2017  1     Cholecalciferol (VITAMIN D3) 400 UNITS CAPS Take 2,500 Units by mouth       VITAMIN D, CHOLECALCIFEROL, PO Take 2,500 Units by mouth At Bedtime       Omega-3 Fatty Acids (FISH OIL PO) Take 2 capsules by mouth At Bedtime       Ascorbic Acid (VITAMIN C) 100 MG TABS Take 1,500 mg by mouth At Bedtime        MULTI-VITAMIN OR TABS 1 TABLET DAILY at bedtime       B COMPLEX OR 1 tablet orally at bedtime        Allergies   Allergen Reactions     Nicotiana Tabacum      Other reaction(s): Breathing Difficulty     Amoxicillin      rash     Cat Hair Extract      Cat Hair [Cats]      No Clinical Screening - See Comments      PN: LW Other1: -CATS     Sulfa Drugs Rash     Tramadol      Other reaction(s): Nausea  Headache  Weirdness  And sick nasuea, vomiting     Ultram [Tramadol Hcl]      Headache  Weirdness  And sick nasuea, vomiting     Family History   Problem Relation Age of Onset     CANCER Mother      ovarian cancer     HEART DISEASE Father      heart attacks with bypass     Hypertension Father      Lipids Father      C.A.D. Father      CANCER Maternal Grandmother      breast cancer     C.A.D. Paternal Grandfather      CANCER Paternal Aunt      cervicle cancer     CANCER Paternal Aunt      uterin cancer     DIABETES Paternal Aunt      Hypertension Brother      CANCER Son      testicular      Social History     Occupational History     Social work      Social History Main Topics     Smoking status: Never Smoker     Smokeless tobacco: Never Used     Alcohol use 3.0 oz/week     5 Cans of beer per week     Drug use: No     Sexual activity: Yes     Partners: Male       PHYSICAL EXAM  Vitals:    01/19/18 0913   BP: 141/90   Pulse: 73   Weight: 62.3 kg (137 lb 6.4 oz)   Height: 1.6 m (5' 3\")     Wt Readings from Last 3 Encounters:   01/19/18 62.3 kg (137 lb 6.4 oz) "   12/14/17 61.5 kg (135 lb 10.4 oz)   11/30/17 59.9 kg (132 lb 1.6 oz)     Constitutional: Alert, no acute distress  Psychiatric: Normal mood and affect  Gastrointestinal: Abdomen soft, non-tender. No masses, organomegaly. No hernia  : Deferred     Recent Labs   Lab Test  10/03/17   1526  07/09/17   2139  02/08/17   0836   WBC  6.9  9.9  6.2   HGB  13.0  12.8  13.1   HCT  39.2  37.9  39.0   PLT  300  312  313     Recent Labs   Lab Test  10/06/17   0849  10/03/17   1526  07/10/17   0618  07/09/17   2139  02/08/17   0836   NA  140  136   --   141  137   POTASSIUM  4.0  2.8*  3.7  3.1*  3.2*   CHLORIDE  105  98   --   104  98   CO2  25  32   --   30  31   ANIONGAP  10  5   --   7  8   GLC  89  100*   --   95  91   BUN  17  13   --   11  12   CR  0.67  0.87   --   0.71  0.66   GFRESTIMATED  90  66   --   84  >90  Non  GFR Calc     GFRESTBLACK  >90  80   --   >90   GFR Calc    >90   GFR Calc     SARITA  8.8  8.8   --   8.8  9.3     Recent Labs   Lab Test  10/03/17   1529   COLOR  Yellow   APPEARANCE  Clear   URINEGLC  Negative   URINEBILI  Negative   URINEKETONE  Negative   SG  1.009   UBLD  Negative   URINEPH  7.0   PROTEIN  Negative   NITRITE  Negative   LEUKEST  Negative   RBCU  0   WBCU  <1       I, Donovan Blank, reviewed these laboratory values.    Scribe Disclosure:   I, Sandie Simon, am serving as a scribe; to document services personally performed by Donovan Blank MD -based on data collection and the provider's statements to me.     Provider Disclosure:  I agree with above History, Review of Systems, Physical exam and Plan.  I have reviewed the content of the documentation and have edited it as needed. I have personally performed the services documented here and the documentation accurately represents those services and the decisions I have made.      Electronically signed by:  Donovan Blank MD      CC  Patient Care Team:  Jordan Kumar MD as PCP -  General (Student in organized health care education/training program)  Colin Blank MD as MD (Urology)  Yajaira Go, RN as Registered Nurse  Rabia Yousif MD as MD (Dermatology)  ZAIRA FINE    Copy to patient  MICKY HIDALGO  [address] [facility] [address]

## 2018-01-19 NOTE — MR AVS SNAPSHOT
After Visit Summary   1/19/2018    Delilah Lopes    MRN: [id number]           Patient Information     Date Of Birth          1955        Visit Information        Provider Department      1/19/2018 9:00 AM Colin Blank MD [facility] Urology and [facility] for Prostate and Urologic Cancers        Today's Diagnoses     Acquired cyst of kidney    -  1      Care Instructions    Follow up with Dr. Blank as needed.    It was a pleasure meeting with you today.  Thank you for allowing me and my team the privilege of caring for you today.  YOU are the reason we are here, and I truly hope we provided you with the excellent service you deserve.  Please let us know if there is anything else we can do for you so that we can be sure you are leaving completely satisfied with your care experience.              Follow-ups after your visit        Your next 10 appointments already scheduled     Apr 04, 2018  9:45 AM CDT   (Arrive by 9:30 AM)   New Patient Visit with Rabia Yousif MD   [facility] Dermatology ([facility])    [address] [place] [address]  [address] [facility] [id number] [phone]            Apr 05, 2018  1:00 PM CDT   (Arrive by 12:45 PM)   Return Visit with Jordan Kumar MD   [facility] Primary Care Clinic ([facility])    [address] [place] [address]  [address] [facility] [id number] [phone]            Jun 01, 2018 10:00 AM CDT   (Arrive by 9:45 AM)   MR ABDOMEN MRCP W/O & W CONTRAST with [address] [facility] MRI ([facility])    [address] [place] [address]  [address] [facility] [id number] [phone]           Take your medicines as usual, unless your doctor tells you not to. Bring a list of your current medicines to your exam (including vitamins, minerals and over-the-counter drugs). Also bring the results of similar scans you may have had.    The day before your exam, drink extra fluids at  least six 8-ounce glasses (unless your doctor tells you to restrict your fluids).   Have a blood test (creatinine test) within 30 days of your exam. Go to your clinic or Diagnostic Imaging Department for this test.   Do not eat or drink for 6 hours prior to exam.  The MRI machine uses a strong magnet. Please wear clothes without metal (snaps, zippers). A sweatsuit works well, or we may give you a hospital gown.  Please remove any body piercings and hair extensions before you arrive. You will also remove watches, jewelry, hairpins, wallets, dentures, partial dental plates and hearing aids. You may wear contact lenses, and you may be able to wear your rings. We have a safe place to keep your personal items, but it is safer to leave them at home.   **IMPORTANT** THE INSTRUCTIONS BELOW ARE ONLY FOR THOSE PATIENTS WHO HAVE BEEN TOLD THEY WILL RECEIVE SEDATION OR GENERAL ANESTHESIA DURING THEIR MRI PROCEDURE:  IF YOU WILL RECEIVE SEDATION (take medicine to help you relax during your exam):   You must get the medicine from your doctor before you arrive. Bring the medicine to the exam. Do not take it at home.   Arrive one hour early. Bring someone who can take you home after the test. Your medicine will make you sleepy. After the exam, you may not drive, take a bus or take a taxi by yourself.   No eating 8 hours before your exam. You may have clear liquids up until 4 hours before your exam. (Clear liquids include water, clear tea, black coffee and fruit juice without pulp.)  IF YOU WILL RECEIVE ANESTHESIA (be asleep for your exam):   Arrive 1 1/2 hours early. Bring someone who can take you home after the test. You may not drive, take a bus or take a taxi by yourself.   No eating 8 hours before your exam. You may have clear liquids up until 4 hours before your exam. (Clear liquids include water, clear tea, black coffee and fruit juice without pulp.)  If you have any questions, please contact your Imaging Department exam  "site.              Who to contact     Please call your clinic at [phone] to:    Ask questions about your health    Make or cancel appointments    Discuss your medicines    Learn about your test results    Speak to your doctor   If you have compliments or concerns about an experience at your clinic, or if you wish to file a complaint, please contact [facility] Patient Relations at [phone] or email us at [email or web address]         Additional Information About Your Visit        Innovate/Protecthart Information     [employer] gives you secure access to your electronic health record. If you see a primary care provider, you can also send messages to your care team and make appointments. If you have questions, please call your primary care clinic.  If you do not have a primary care provider, please call [phone] and they will assist you.      [employer] is an electronic gateway that provides easy, online access to your medical records. With [employer], you can request a clinic appointment, read your test results, renew a prescription or communicate with your care team.     To access your existing account, please contact your [facility] or call [phone] for assistance.        Care EveryWhere ID     This is your Care EveryWhere ID. This could be used by other organizations to access your [facility] medical records  [id number]        Your Vitals Were     Pulse Height Last Period BMI (Body Mass Index)          73 1.6 m (5' 3\") 02/12/2006 24.34 kg/m2         Blood Pressure from Last 3 Encounters:   01/19/18 141/90   12/14/17 135/84   11/30/17 (!) 148/92    Weight from Last 3 Encounters:   01/19/18 62.3 kg (137 lb 6.4 oz)   12/14/17 61.5 kg (135 lb 10.4 oz)   11/30/17 59.9 kg (132 lb 1.6 oz)              Today, you had the following     No orders found for display       Primary Care Provider Office Phone # Fax #    Jordan Kumar [phone]    "    [address]  [facility] [address]        Equal Access to Services     VELMAKELLY SHAYE : Hadii aad ku hadrayao Soghassanali, waaxda luqadaha, qaybta kaalmada oneidalucianadia, kellie crook ramyawendi marcusmaximusbeltran murray. So [facility] [phone].    ATENCIÓN: Si habla español, tiene a chakraborty disposición servicios gratuitos de asistencia lingüística. Llame al [phone].    We comply with applicable federal civil rights laws and [place] laws. We do not discriminate on the basis of race, color, national origin, age, disability, sex, sexual orientation, or gender identity.            Thank you!     Thank you for choosing [facility] UROLOGY AND [facility] FOR PROSTATE AND UROLOGIC CANCERS  for your care. Our goal is always to provide you with excellent care. Hearing back from our patients is one way we can continue to improve our services. Please take a few minutes to complete the written survey that you may receive in the mail after your visit with us. Thank you!             Your Updated Medication List - Protect others around you: Learn how to safely use, store and throw away your medicines at www.disposemymeds.org.          This list is accurate as of 1/19/18 11:59 PM.  Always use your most recent med list.                   Brand Name Dispense Instructions for use Diagnosis    albuterol 108 (90 BASE) MCG/ACT Inhaler    VENTOLIN HFA    1 Inhaler    Inhale 2 puffs into the lungs every 6 hours as needed for shortness of breath / dyspnea    Mild persistent asthma       B COMPLEX PO      1 tablet orally at bedtime        clindamycin 1 % Swab    CLEOCIN T     USE 1 SWAB TOPICALLY DAILY as needed..Helen Go LPN 1:46 PM on 12/14/2017        FISH OIL PO      Take 2 capsules by mouth At Bedtime        lisinopril 5 MG tablet    PRINIVIL/ZESTRIL    90 tablet    Take 1 tablet (5 mg) by mouth daily    Essential hypertension       * ASMANEX 30 METERED DOSES 110 MCG/INH inhaler   Generic drug:  mometasone      INHALE 1 PUFF  ONCE DAILY FOR ASTHMA UTILIZE WHEN OUT OF TOWN AND NEBULIZER IS NOT AVAILABLE        * mometasone 220 MCG/INH Inhaler    ASMANEX 30 METERED DOSES    3 Inhaler    Inhale 1 puff into the lungs daily    Mild persistent asthma without complication       Multi-vitamin Tabs tablet   Generic drug:  multivitamin, therapeutic with minerals      1 TABLET DAILY at bedtime        Vitamin C 100 MG Tabs      Take 1,500 mg by mouth At Bedtime        VITAMIN C PO      Take 1,500 mg by mouth daily        * VITAMIN D (CHOLECALCIFEROL) PO      Take 2,500 Units by mouth At Bedtime        * Vitamin D3 400 UNITS Caps      Take 2,500 Units by mouth        * Notice:  This list has 4 medication(s) that are the same as other medications prescribed for you. Read the directions carefully, and ask your doctor or other care provider to review them with you.

## 2018-01-19 NOTE — PROGRESS NOTES
ASSESSMENT AND PLAN  Right Renal Cyst 1.1cm  - MRI from 11/24/17 demonstrated renal cyst. Follow up with me as needed.     Pancreatic Ductal Dilation/Distal Pancreatic Ductal Irregularity:  - Follow up with PCP  _______________________________________________________________________    CHIEF COMPLAINT  It was my pleasure to see Delilah ALCANTARA Donald Lopes who is a 62 year old female for evaluation of renal mass    HPI   She has had a long standing hemorrhagic cyst for 5 years. She had a hysterectomy in 2011 and her ureter was nicked in the process.  She had a stent placed for the ureteral injury which was subsequently removed. Her cyst was discovered after that. In 2015 she was at [place] where they recommended observation of the cyst.      She returns to clinic today for follow up after MRI on 11/24/17. She has been doing well since then. Of note, she experienced symptoms of a bladder infection following MRI due to contrast. Subsequent UA did not demonstrate evidence of infection.     Pancreatic ductal dilation and distal pancreatic ductal irregularity were incidentally discovered on MRI from 11/2017. She is following up with her PCP for this and is scheduled to have a repeat MRI in June.       RADIOLOGIC IMAGING  11/24/17 MRI Abdomen With and Without Contrast  1. In this patient with a reported history of a 1 cm right renal mass,  there is a 1 cm exophytic right lower pole lesion most consistent with  renal cyst. No convincing evidence of an enhancing soft tissue  component. Comparison with the outside ultrasound 12/20/2016 and  outside CT from  would be helpful if available.     2. Mild pancreatic ductal dilatation and distal pancreatic ductal  irregularity as well as a 2 mm dilated side branch versus side branch  IPMN. Institutional follow-up recommendations for pancreatic cysts  below.     3. Scattered hepatic cysts.   I reviewed the recent radiologic imaging and reports described above.    Patient Active Problem  List    Diagnosis Date Noted     Essential hypertension 2015     Priority: Medium     CARDIOVASCULAR SCREENING; LDL GOAL LESS THAN 160 10/31/2010     Priority: Medium     Cold sore 2009     Priority: Medium     Mild persistent asthma 2006     Priority: Medium     Stricture and stenosis of esophagus 2002     Priority: Medium     Urethral stricture 2002     Priority: Medium     Problem list name updated by automated process. Provider to review       Past Medical History:   Diagnosis Date     Essential hypertension 2015     Melanoma (H) 2016     Mild persistent asthma      Osteoarthritis      Stricture and stenosis of esophagus     Dx , [facility]     Urethral stricture unspecified      Past Surgical History:   Procedure Laterality Date     C NONSPECIFIC PROCEDURE       x3 ,,     HERNIA REPAIR      2 hernia repairs     HYSTERECTOMY, PAP NO LONGER INDICATED       STENT  2017    kidney     SURGICAL PATHOLOGY EXAM  2017     Current Outpatient Prescriptions   Medication Sig Dispense Refill     ASMANEX 30 METERED DOSES 110 MCG/INH inhaler INHALE 1 PUFF ONCE DAILY FOR ASTHMA UTILIZE WHEN OUT OF TOWN AND NEBULIZER IS NOT AVAILABLE  4     albuterol (VENTOLIN HFA) 108 (90 BASE) MCG/ACT Inhaler Inhale 2 puffs into the lungs every 6 hours as needed for shortness of breath / dyspnea 1 Inhaler 1     Ascorbic Acid (VITAMIN C PO) Take 1,500 mg by mouth daily       mometasone (ASMANEX 30 METERED DOSES) 220 MCG/INH Inhaler Inhale 1 puff into the lungs daily 3 Inhaler 3     lisinopril (PRINIVIL/ZESTRIL) 5 MG tablet Take 1 tablet (5 mg) by mouth daily 90 tablet 3     clindamycin (CLEOCIN T) 1 % SWAB USE 1 SWAB TOPICALLY DAILY as needed..Helen Go LPN 1:46 PM on 2017  1     Cholecalciferol (VITAMIN D3) 400 UNITS CAPS Take 2,500 Units by mouth       VITAMIN D, CHOLECALCIFEROL, PO Take 2,500 Units by mouth At Bedtime       Omega-3 Fatty Acids (FISH OIL  "PO) Take 2 capsules by mouth At Bedtime       Ascorbic Acid (VITAMIN C) 100 MG TABS Take 1,500 mg by mouth At Bedtime        MULTI-VITAMIN OR TABS 1 TABLET DAILY at bedtime       B COMPLEX OR 1 tablet orally at bedtime        Allergies   Allergen Reactions     Nicotiana Tabacum      Other reaction(s): Breathing Difficulty     Amoxicillin      rash     Cat Hair Extract      Cat Hair [Cats]      No Clinical Screening - See Comments      PN: LW Other1: -CATS     Sulfa Drugs Rash     Tramadol      Other reaction(s): Nausea  Headache  Weirdness  And sick nasuea, vomiting     Ultram [Tramadol Hcl]      Headache  Weirdness  And sick nasuea, vomiting     Family History   Problem Relation Age of Onset     CANCER Mother      ovarian cancer     HEART DISEASE Father      heart attacks with bypass     Hypertension Father      Lipids Father      C.A.D. Father      CANCER Maternal Grandmother      breast cancer     C.A.D. Paternal Grandfather      CANCER Paternal Aunt      cervicle cancer     CANCER Paternal Aunt      uterin cancer     DIABETES Paternal Aunt      Hypertension Brother      CANCER Son      testicular      Social History     Occupational History     Social work      Social History Main Topics     Smoking status: Never Smoker     Smokeless tobacco: Never Used     Alcohol use 3.0 oz/week     5 Cans of beer per week     Drug use: No     Sexual activity: Yes     Partners: Male       PHYSICAL EXAM  Vitals:    01/19/18 0913   BP: 141/90   Pulse: 73   Weight: 62.3 kg (137 lb 6.4 oz)   Height: 1.6 m (5' 3\")     Wt Readings from Last 3 Encounters:   01/19/18 62.3 kg (137 lb 6.4 oz)   12/14/17 61.5 kg (135 lb 10.4 oz)   11/30/17 59.9 kg (132 lb 1.6 oz)     Constitutional: Alert, no acute distress  Psychiatric: Normal mood and affect  Gastrointestinal: Abdomen soft, non-tender. No masses, organomegaly. No hernia  : Deferred     Recent Labs   Lab Test  10/03/17   1526  07/09/17   2139  02/08/17   0836   WBC  6.9  9.9  6.2 "   HGB  13.0  12.8  13.1   HCT  39.2  37.9  39.0   PLT  300  312  313     Recent Labs   Lab Test  10/06/17   0849  10/03/17   1526  07/10/17   0618  07/09/17   2139  02/08/17   0836   NA  140  136   --   141  137   POTASSIUM  4.0  2.8*  3.7  3.1*  3.2*   CHLORIDE  105  98   --   104  98   CO2  25  32   --   30  31   ANIONGAP  10  5   --   7  8   GLC  89  100*   --   95  91   BUN  17  13   --   11  12   CR  0.67  0.87   --   0.71  0.66   GFRESTIMATED  90  66   --   84  >90  Non  GFR Calc     GFRESTBLACK  >90  80   --   >90   GFR Calc    >90   GFR Calc     SARITA  8.8  8.8   --   8.8  9.3     Recent Labs   Lab Test  10/03/17   1529   COLOR  Yellow   APPEARANCE  Clear   URINEGLC  Negative   URINEBILI  Negative   URINEKETONE  Negative   SG  1.009   UBLD  Negative   URINEPH  7.0   PROTEIN  Negative   NITRITE  Negative   LEUKEST  Negative   RBCU  0   WBCU  <1       I, Donovan Blank, reviewed these laboratory values.    Scribe Disclosure:   I, Sandie Simon, am serving as a scribe; to document services personally performed by Donovan Blank MD -based on data collection and the provider's statements to me.     Provider Disclosure:  I agree with above History, Review of Systems, Physical exam and Plan.  I have reviewed the content of the documentation and have edited it as needed. I have personally performed the services documented here and the documentation accurately represents those services and the decisions I have made.      Electronically signed by:  Donovan Blank MD        Patient Care Team:  Jordan Kumar MD as PCP - General (Student in organized health care education/training program)  Colin Blank MD as MD (Urology)  Yajaira Go, RN as Registered Nurse  Rabia Yousif MD as MD (Dermatology)  ZAIRA FINE    Copy to patient  MICKY HIDALGO  [address] [facility] [id number]

## 2018-01-19 NOTE — PATIENT INSTRUCTIONS
Follow up with Dr. Blank as needed.    It was a pleasure meeting with you today.  Thank you for allowing me and my team the privilege of caring for you today.  YOU are the reason we are here, and I truly hope we provided you with the excellent service you deserve.  Please let us know if there is anything else we can do for you so that we can be sure you are leaving completely satisfied with your care experience.

## 2018-01-24 ENCOUNTER — CARE COORDINATION (OUTPATIENT)
Dept: NEPHROLOGY | Facility: CLINIC | Age: 63
End: 2018-01-24

## 2018-02-21 ENCOUNTER — CARE COORDINATION (OUTPATIENT)
Dept: NEPHROLOGY | Facility: CLINIC | Age: 63
End: 2018-02-21

## 2018-03-01 ENCOUNTER — TELEPHONE (OUTPATIENT)
Dept: FAMILY MEDICINE | Facility: CLINIC | Age: 63
End: 2018-03-01

## 2018-03-01 DIAGNOSIS — J11.1 INFLUENZA-LIKE ILLNESS: Primary | ICD-10-CM

## 2018-03-01 RX ORDER — OSELTAMIVIR PHOSPHATE 75 MG/1
75 CAPSULE ORAL 2 TIMES DAILY
Qty: 10 CAPSULE | Refills: 0 | Status: SHIPPED | OUTPATIENT
Start: 2018-03-01 | End: 2018-07-19

## 2018-03-01 NOTE — TELEPHONE ENCOUNTER
Influenza-Like Illness (YASIR) Protocol    Delilah ALCANTARA Donaldpanchito Lopes      Age: 62 year old     YOB: 1955    Are you currently sick or have you had close contact with someone who is currently sick?   Yes, this patient is currently sick.     Adult Clinical Evaluation    Is this patient experiencing ANY of the following?  Unconsciousness or unresponsiveness No   Difficulty breathing or swallowing No   Blue or dusky lips, skin, or nail beds No   Chest pain No   Severe confusion or delirium No   Seizure activity: ongoing or stopped No   Severe dehydration or signs of shock No   Patient sounds very sick on the phone No     Is this patient experiencing ANY of the following?  Fever > 104 or shaking chills No   Wheezing with minimal response to usual wheezing medications or new wheezing No   Repeated vomiting or diarrhea with signs of dehydration (no urination within last 12 hours) No   Flu-like symptoms that initially improved but returned with fever and a worse cough No   Stiff or painful neck No   Severe headache No     Does the patient have any of the following?  Measured fever > 100 degrees Feels feverish   Chills or feels very warm to the touch Yes   Cough Yes - productive - green mucus   Sore throat No   Muscle/ body aches Yes   Headaches Yes   Fatigue (tiredness) Yes     Nursing Plan      Below are conditions which place adults at increased risk for the more severe complications of influenza.    Does this patient have ANY of the following conditions?  Chronic pulmonary disease such as asthma or COPD Yes   Heart disease (CHF, CAD, anticoag due to arrhythmia) No   Liver disease (hepatitis, liver failure, cirrhosis) No   Kidney disease (renal failure, insufficiency or dialysis) No   Metabolic disorder (e.g. diabetes) No   Neuromuscular disorder (TONYA MASON MD, myasthenia gravis) No   Compromised ability to handle respiratory secretions No   Hematologic disorder (e.g. sickle cell disease) No   HIV / AIDS No    Chemotherapy or radiation within the last 3 months No   Received an organ or a bone marrow transplant No   Taking Prednisone in excess of 20mg daily No   Is age 65 years or older No   Is pregnant, thinks she may be pregnant or is within two weeks after delivery No   Is a resident of a chronic care facility No   Is patient  or [place] native  No     Patient falls into high risk category.   YASIR symptom onset less than 48 hours.    Allergies   Allergen Reactions     Nicotiana Tabacum      Other reaction(s): Breathing Difficulty     Amoxicillin      rash     Cat Hair Extract      Cat Hair [Cats]      No Clinical Screening - See Comments      PN: LW Other1: -CATS     Sulfa Drugs Rash     Tramadol      Other reaction(s): Nausea  Headache  Weirdness  And sick nasuea, vomiting     Ultram [Tramadol Hcl]      Headache  Weirdness  And sick nasuea, vomiting       Does this patient have an allergy or hypersensitivity to Oseltamivir (Tamiflu)?  No.      Patient Active Problem List   Diagnosis     Stricture and stenosis of esophagus     Urethral stricture     Mild persistent asthma     Cold sore     CARDIOVASCULAR SCREENING; LDL GOAL LESS THAN 160     Essential hypertension       Last recorded GFR on this patient:   GFR Estimate   Date Value Ref Range Status   12/14/2017 80 >60 mL/min/1.7m2 Final     Comment:     Non  GFR Calc     GFR Estimate If Black   Date Value Ref Range Status   12/14/2017 >90 >60 mL/min/1.7m2 Final     Comment:      GFR Calc       Sex:  female     Wt Readings from Last 1 Encounters:   01/19/18 137 lb 6.4 oz (62.3 kg)       Age:  62 year old     Creatinine   Date Value Ref Range Status   12/14/2017 0.74 0.52 - 1.04 mg/dL Final     Creatinine Clearance Calculator    Does this patient currently have kidney disease? (defined as Chronic Kidney Disease Stage 3,4 or 5 on the problem list or a GFR less than 60 ml/min if known)  No - Tamiflu (oseltamivir):  75 mg BID x  5 days    If further questions/concerns or if new symptoms develop, call your PCP or [facility] as soon as possible.      Provided home care instructions    General home care instruction:      Avoid contact with people in your household who are at increased risk for more severe complications of influenza (such as pregnant women or people who have a chronic health condition, for example diabetes, heart disease, asthma, or emphysema).    Stay home from work, school, childcare or other public places until your fever (37.8 degrees Celsius [100 degrees Fahrenheit]) has been gone for at least 24 hours, except to seek medical care. (Fever should be gone without the use of fever-reducing medications.) Use a surgical mask if available, or cover your mouth and nose with a tissue if possible if you need to seek medical care. Contact your work place, school, or  as they may have longer exclusion times.    You may continue to shed virus after your fever is gone. Limit your contact with high-risk individuals for 10 days after your symptoms started and be especially careful to cover your coughs/sneezes and wash your hands.    Cover your cough and wash your hands often, and especially after coughing, sneezing, blowing your nose.    Drink plenty of fluids (such as water, broth, sports drinks, electrolyte beverages for children) to prevent dehydration.    Avoid tobacco and second hand smoke.    Get plenty of rest.    Use over-the-counter pain relievers as needed per  instructions.    Do not give aspirin (acetylsalicylic acid) or products that contain aspirin (e.g. bismuth subsalicylate - Pepto Bismol) to children or teenagers 18 years or younger.    A small number of people with influenza do not have fever. If you have respiratory symptoms and are at increased risk for complications of influenza, contact your health care provider to discuss these symptoms.    For parents of infants:    If possible,  only family members who are not sick should care for infants.    Wash your hands with soap and water, or an alcohol-based hand rub (if your hands are not visibly soiled) before caring for your infant.    Cover your mouth and nose with a tissue when coughing or sneezing, and clean your hands.    Contact a health care provider to discuss your illness within 1-2 days if you are    Pregnant    Immunocompromised    Call 911 if you experience:    Difficulty breathing or shortness of breath    Pain or pressure in the chest    Confusion or less responsive than normal    Seizure activity: ongoing or stopped    Severe dehydration or signs of shock     Blue or dusky lips, skin, or nail beds    If further questions/concerns or if new symptoms develop, call your PCP or [facility] as soon as possible.    When to seek medical attention    Contact your health care provider right away if you experience:    A painful sore throat accompanied by fever persists for more than 48 hours    Ear pain, sinus pain, persistent vomiting and/or diarrhea    Oral temperature greater than 104  Fahrenheit (40  Celsius)    Dehydration (e.g., mouth feeling dry, dizzy when sitting/standing, decreased urine output)    Severe or persistent vomiting; unable to keep fluids down    Improvement in flu-like symptoms (fever and cough or sore throat) but then return of fever and worse cough or sore throat    Not drinking enough fluid    Any other concerns not stated above      Additional educational resources include:    http://www.[employer].com    http://www.cdc.gov/flu/  Margret Cullen

## 2018-03-23 ENCOUNTER — CARE COORDINATION (OUTPATIENT)
Dept: NEPHROLOGY | Facility: CLINIC | Age: 63
End: 2018-03-23

## 2018-03-30 ENCOUNTER — TELEPHONE (OUTPATIENT)
Dept: DERMATOLOGY | Facility: CLINIC | Age: 63
End: 2018-03-30

## 2018-03-30 NOTE — TELEPHONE ENCOUNTER
Left message for Pt regarding upcoming appointment on 4/4 with Dr. Yousif. Asked that shebring an updated list of medications and outside records with, and Reminded Pt of appointment time and location. Also, gave Pt our clinic number to call in case she needs to cancel.  Reminded to come 15 minutes early, and gave clinic phone number too.

## 2018-04-17 ENCOUNTER — CARE COORDINATION (OUTPATIENT)
Dept: NEPHROLOGY | Facility: CLINIC | Age: 63
End: 2018-04-17

## 2018-04-17 NOTE — PROGRESS NOTES
Reason for Call    Patient called back. States she's feeling well, denied any symptoms or concerns. 's / 80's at home, but she isn't checking it regularly.    Collaboration    Updated Dr. Waters.    Patient Education    1. Call this nurse if systolic (top number) blood pressure is consistently <110 or >160 for further instructions.     Plan    1. Monitor BP   2. Call if any change with symptoms or concerns    Patient was given an opportunity to ask questions and have those questions answered to her satisfaction.  Patient verbalized understanding of instructions provided and agreed to plan of care.    Chuyita Gordon RN

## 2018-05-30 ENCOUNTER — TELEPHONE (OUTPATIENT)
Dept: INTERNAL MEDICINE | Facility: CLINIC | Age: 63
End: 2018-05-30

## 2018-05-30 NOTE — TELEPHONE ENCOUNTER
[facility] Call Center    Phone Message    May a detailed message be left on voicemail: yes    Reason for Call: Other: Please follow up with pt, she would look like to know if Dr. Kumar left for good or is on leave.     Action Taken: Message routed to:  [facility]

## 2018-05-31 NOTE — TELEPHONE ENCOUNTER
Message was left for patient explaining that Dr. Kumar is a resident and his schedule at the [facility] is approximately 4 weeks on and 4 weeks off.    LYNN CLARK at 9:18 AM on 5/31/2018.

## 2018-06-14 ENCOUNTER — TELEPHONE (OUTPATIENT)
Dept: DERMATOLOGY | Facility: CLINIC | Age: 63
End: 2018-06-14

## 2018-06-14 NOTE — TELEPHONE ENCOUNTER
I called and left eDlilah Lopes a voicemail reminding her of upcoming appointment. Clinic number provided of they need to cancel.    PERLITA Beltran

## 2018-07-18 ENCOUNTER — RADIANT APPOINTMENT (OUTPATIENT)
Dept: MRI IMAGING | Facility: CLINIC | Age: 63
End: 2018-07-18
Attending: INTERNAL MEDICINE
Payer: COMMERCIAL

## 2018-07-18 DIAGNOSIS — K86.2 PANCREAS CYST: ICD-10-CM

## 2018-07-18 RX ORDER — GADOBUTROL 604.72 MG/ML
7.5 INJECTION INTRAVENOUS ONCE
Status: COMPLETED | OUTPATIENT
Start: 2018-07-18 | End: 2018-07-18

## 2018-07-18 RX ADMIN — GADOBUTROL 7.5 ML: 604.72 INJECTION INTRAVENOUS at 07:36

## 2018-07-18 NOTE — DISCHARGE INSTRUCTIONS
MRI Contrast Discharge Instructions    The IV contrast you received today will pass out of your body in your  urine. This will happen in the next 24 hours. You will not feel this process.  Your urine will not change color.    Drink at least 4 extra glasses of water or juice today (unless your doctor  has restricted your fluids). This reduces the stress on your kidneys.  You may take your regular medicines.    If you are on dialysis: It is best to have dialysis today.    If you have a reaction: Most reactions happen right away. If you have  any new symptoms after leaving the hospital (such as hives or swelling),  call your hospital at the correct number below. Or call your family doctor.  If you have breathing distress or wheezing, call 911.    Special instructions: ***    I have read and understand the above information.    Signature:______________________________________ Date:___________    Staff:__________________________________________ Date:___________     Time:__________    [facility] Departments:    ___[place]: [phone]  ___[facility]: [phone]  ___[place]: [phone] ___[facility]: [phone]  ___[facility]: [phone]  ___[facility]: [phone]  ___[phone]  ___[facility]: [phone]  ___[phone]

## 2018-07-19 ENCOUNTER — TELEPHONE (OUTPATIENT)
Dept: GASTROENTEROLOGY | Facility: CLINIC | Age: 63
End: 2018-07-19

## 2018-07-19 ENCOUNTER — OFFICE VISIT (OUTPATIENT)
Dept: INTERNAL MEDICINE | Facility: CLINIC | Age: 63
End: 2018-07-19
Payer: COMMERCIAL

## 2018-07-19 VITALS
SYSTOLIC BLOOD PRESSURE: 124 MMHG | HEART RATE: 80 BPM | DIASTOLIC BLOOD PRESSURE: 82 MMHG | WEIGHT: 127.9 LBS | BODY MASS INDEX: 22.66 KG/M2

## 2018-07-19 DIAGNOSIS — K86.89 PANCREATIC DUCT DILATED: Primary | ICD-10-CM

## 2018-07-19 DIAGNOSIS — K83.8 COMMON BILE DUCT DILATATION: Primary | ICD-10-CM

## 2018-07-19 DIAGNOSIS — K86.89 PANCREATIC DUCT DILATED: ICD-10-CM

## 2018-07-19 LAB
ALBUMIN SERPL-MCNC: 4.2 G/DL (ref 3.4–5)
ALP SERPL-CCNC: 78 U/L (ref 40–150)
ALT SERPL W P-5'-P-CCNC: 20 U/L (ref 0–50)
AST SERPL W P-5'-P-CCNC: 13 U/L (ref 0–45)
BILIRUB DIRECT SERPL-MCNC: 0.2 MG/DL (ref 0–0.2)
BILIRUB SERPL-MCNC: 1 MG/DL (ref 0.2–1.3)
PROT SERPL-MCNC: 8 G/DL (ref 6.8–8.8)

## 2018-07-19 ASSESSMENT — PAIN SCALES - GENERAL: PAINLEVEL: NO PAIN (0)

## 2018-07-19 NOTE — MR AVS SNAPSHOT
After Visit Summary   7/19/2018    Delilah Lopes    MRN: [id number]           Patient Information     Date Of Birth          1955        Visit Information        Provider Department      7/19/2018 9:15 AM Maria Elena Rice MD [facility]        Today's Diagnoses     Pancreatic duct dilated    -  1      Care Instructions    Primary Care Center Medication Refill Request Information:  * Please contact your pharmacy regarding ANY request for medication refills.  ** [facility] Prescription Fax = [phone]  * Please allow 3 business days for routine medication refills.  * Please allow 5 business days for controlled substance medication refills.     Primary Care Center Test Result notification information:  *You will be notified with in 7-10 days of your appointment day regarding the results of your test.  If you are on MyChart you will be notified as soon as the provider has reviewed the results and signed off on them.    [facility]: [phone]             Follow-ups after your visit        Additional Services     GASTROENTEROLOGY ADULT REFERRAL       Preferred Location: [facility] [phone]  Reason for Referral:  ?need for ERCP/EUS      Please be aware that coverage of these services is subject to the terms and limitations of your health insurance plan.  Call member services at your health plan with any benefit or coverage questions.  Any procedures must be performed at a [place] facility OR coordinated by your clinic's referral office.    Please bring the following with you to your appointment:    (1) Any X-Rays, CTs or MRIs which have been performed.  Contact the facility where they were done to arrange for  prior to your scheduled appointment.    (2) List of current medications   (3) This referral request   (4) Any documents/labs given to you for this referral                  Follow-up notes from your care team     Return if symptoms worsen or  fail to improve.      Future tests that were ordered for you today     Open Future Orders        Priority Expected Expires Ordered    Hepatic panel Routine 7/19/2018 8/2/2018 7/19/2018            Who to contact     Please call your clinic at [phone] to:    Ask questions about your health    Make or cancel appointments    Discuss your medicines    Learn about your test results    Speak to your doctor            Additional Information About Your Visit        [employer] Information     [employer] gives you secure access to your electronic health record. If you see a primary care provider, you can also send messages to your care team and make appointments. If you have questions, please call your primary care clinic.  If you do not have a primary care provider, please call [phone] and they will assist you.      [employer] is an electronic gateway that provides easy, online access to your medical records. With [employer], you can request a clinic appointment, read your test results, renew a prescription or communicate with your care team.     To access your existing account, please contact your [facility] or call [phone] for assistance.        Care EveryWhere ID     This is your Care EveryWhere ID. This could be used by other organizations to access your [facility] medical records  [id number]        Your Vitals Were     Pulse Last Period BMI (Body Mass Index)             80 02/12/2006 22.66 kg/m2          Blood Pressure from Last 3 Encounters:   07/19/18 124/82   01/19/18 141/90   12/14/17 135/84    Weight from Last 3 Encounters:   07/19/18 58 kg (127 lb 14.4 oz)   01/19/18 62.3 kg (137 lb 6.4 oz)   12/14/17 61.5 kg (135 lb 10.4 oz)              We Performed the Following     GASTROENTEROLOGY ADULT REFERRAL          Today's Medication Changes          These changes are accurate as of 7/19/18 10:58 AM.  If you have any questions, ask your nurse or doctor.               These medicines have  changed or have updated prescriptions.        Dose/Directions    VITAMIN D (CHOLECALCIFEROL) PO   This may have changed:  Another medication with the same name was removed. Continue taking this medication, and follow the directions you see here.   Changed by:  Maria Elena Rice MD        Dose:  2500 Units   Take 2,500 Units by mouth At Bedtime   Refills:  0         Stop taking these medicines if you haven't already. Please contact your care team if you have questions.     ASMANEX 30 METERED DOSES 110 MCG/INH inhaler   Generic drug:  mometasone   Stopped by:  Maria Elena Rice MD           mometasone 220 MCG/INH Inhaler   Commonly known as:  ASMANEX 30 METERED DOSES   Stopped by:  Maria Elena Rice MD           oseltamivir 75 MG capsule   Commonly known as:  TAMIFLU   Stopped by:  Maria Elena Rice MD                    Primary Care Provider Office Phone # Fax #    Jordan MD Stacy [phone]       [address]  [facility] [address]        Equal Access to Services     [facility]: Hadii aad ku hadasho Soomaali, waaxda luqadaha, qaybta kaalmada adeegyada, waxay idiin hayaan adeeg jovanny rowley . So [facility] [phone].    ATENCIÓN: Si habla español, tiene a chakraborty disposición servicios gratuitos de asistencia lingüística. [facility] [phone].    We comply with applicable federal civil rights laws and [place] laws. We do not discriminate on the basis of race, color, national origin, age, disability, sex, sexual orientation, or gender identity.            Thank you!     Thank you for choosing [facility]  for your care. Our goal is always to provide you with excellent care. Hearing back from our patients is one way we can continue to improve our services. Please take a few minutes to complete the written survey that you may receive in the mail after your visit with us. Thank you!             Your Updated Medication List - Protect others around you: Learn  how to safely use, store and throw away your medicines at www.disposemymeds.org.          This list is accurate as of 7/19/18 10:58 AM.  Always use your most recent med list.                   Brand Name Dispense Instructions for use Diagnosis    albuterol 108 (90 Base) MCG/ACT Inhaler    VENTOLIN HFA    1 Inhaler    Inhale 2 puffs into the lungs every 6 hours as needed for shortness of breath / dyspnea    Mild persistent asthma       B COMPLEX PO      1 tablet orally at bedtime        clindamycin 1 % Swab    CLEOCIN T     USE 1 SWAB TOPICALLY DAILY as needed..Helen Go LPN 1:46 PM on 12/14/2017        FISH OIL PO      Take 2 capsules by mouth At Bedtime        lisinopril 5 MG tablet    PRINIVIL/ZESTRIL    90 tablet    Take 1 tablet (5 mg) by mouth daily    Essential hypertension       Multi-vitamin Tabs tablet   Generic drug:  multivitamin, therapeutic with minerals      1 TABLET DAILY at bedtime        Vitamin C 100 MG Tabs      Take 1,500 mg by mouth At Bedtime        VITAMIN C PO      Take 1,500 mg by mouth daily        VITAMIN D (CHOLECALCIFEROL) PO      Take 2,500 Units by mouth At Bedtime

## 2018-07-19 NOTE — NURSING NOTE
Chief Complaint   Patient presents with     Results     pt would like to discuss MRI       Maia Eller CMA at 9:16 AM on 7/19/2018.

## 2018-07-19 NOTE — TELEPHONE ENCOUNTER
[facility] Call Center    Phone Message    May a detailed message be left on voicemail: yes    Reason for Call: Other: .    Referring provider: Dr. Maria Elena Rice     Providers RNCC contact:   [phone]      Requested provider:   None      Indication/Diagnosis for consultation:    Pancreatic duct dilated    Is diagnosis on list of approved diagnosis:    Yes     Has patient been evaluated in clinic or had a procedure [facility] provider in the last 5 years:    No     Has patient been evaluated by another Gastroenterologist?    No    Imaging completed:     CT scan     No  MRI         Yes - Internal on 7/18/2018    Procedures:     Upper Endoscopy/EGD   Yes - Internal on 1/28/2013    Endoscopic Ultrasound/EUS        No    ERCP      No    Colonoscopy    No      Are images able/being pushed to our system?     Yes     Is patient aware of request for clinic consultation and ok to be contacted to schedule?     Yes       Action Taken: Message routed to:  [facility]

## 2018-07-19 NOTE — PROGRESS NOTES
Rooming Note  Health Maintenance   Health Maintenance Due   Topic Date Due     HIV SCREEN (SYSTEM ASSIGNED)  10/04/1974     ASTHMA ACTION PLAN Q1 YR  12/15/2006     ADVANCE DIRECTIVE PLANNING Q5 YRS  10/04/2011     ASTHMA CONTROL TEST Q6 MOS  08/08/2017     COLON CANCER SCREEN (SYSTEM ASSIGNED)  06/18/2018     PHQ-2 Q1 YR  07/12/2018    All health maintenance items discussed and pended.  Blood Pressure   BP Readings from Last 1 Encounters:   07/19/18 (!) 155/97    Single BP recheck started, 9:19 AM (4 minutes)

## 2018-07-19 NOTE — PROGRESS NOTES
"Chief Complaint   Delilah Lopes is a 62 year old female presents for   Chief Complaint   Patient presents with     Results     pt would like to discuss MRI      SUBJECTIVE:  61 yo F with h/o HTN, mild persistent asthma, and renal cyst (present since 2006 per pt) for which he has been undergoing regular surveillance here to discuss results of recent MRCP.  In brief, patient underwent abdominal MRI November 2017 as part of her surveillance regimen for her renal cyst.  Renal cyst has been stable, however, MRI revealed \"Mild [4 mm] pancreatic ductal dilatation and distal pancreatic ductal irregularity as well as a 2 mm dilated side branch versus side branch IPMN\" per radiologist's report.  Per [facility]'s asymptomatic pancreatic cyst surveillance guideline, patient had f/u MRCP 6 months later (which was yesterday).  More recent MRCP revealing resolution of the 2 mm cystic-looking lesion but persistent 4 mm dilation of the pancreatic duct and \"multifocal side branch ectasia...most prominent in the pancreatic tail region, and would be in keeping with early mild chronic pancreatitis.\"    Patient has long-standing history of constipation and diverticulosis.  She does endorse intermittent abdominal \"discomfort\" relating to constipation, but specifies that this is not pain per se.  She takes a cocktail of OTC supplements including magnesium oxide for her constipation.  At times she has noticed that her stools are looser than usual, generally correlated with taking a higher-than-usual dose of her bowel regimen.  Intermittent RLQ pain which she has had since repair x2 of R-sided femoral hernia.  Has not noticed abdominal pain correlated with high-fat meals.  Denies steatorrhea, foul-smelling stools, excessive flatulence.  Her fasting glucoses have been wnl over the last year.  Does endorse frequent urination but this is related to large intake of fluids as pt likes to \"stay hydrated.\"  Eating well-balanced diet; does admit to " frequent beer drinking, 10-15 beers/wk particularly on the weekend.  Has been trying to cut back.    Medications and allergies were reviewed by me today.     Social History   History   Smoking Status     Never Smoker   Smokeless Tobacco     Never Used      PHQ-2 ( 1999 [employer]) 7/12/2017 2/8/2017   Q1: Little interest or pleasure in doing things 0 0   Q2: Feeling down, depressed or hopeless 0 0   PHQ-2 Score 0 0     No flowsheet data found.    Past Medical History   Patient Active Problem List   Diagnosis     Stricture and stenosis of esophagus     Urethral stricture     Mild persistent asthma     Cold sore     CARDIOVASCULAR SCREENING; LDL GOAL LESS THAN 160     Essential hypertension       Review of Systems   5 point ROS completed and negative except noted above, including Gen, CV, Resp, GI, MS    Physical Exam   /82  Pulse 80  Wt 58 kg (127 lb 14.4 oz)  LMP 02/12/2006  BMI 22.66 kg/m2  Gen: no distress, comfortable, pleasant   Eyes: sclera anicteric, normal extra-ocular movements   Cardiovascular: regular rate and rhythm, normal S1 and S2, no murmurs, rubs or gallops, peripheral pulses full and symmetric   Respiratory: clear to auscultation, no wheezes or crackles, normal breath sounds   Gastrointestinal: positive bowel sounds, nondistended; TTP in epigastrium and RUQ  Skin: no concerning lesions, no jaundice   Psychological: appropriate mood     Assessment & Plan    61 yo F with h/o HTN, asthma, and asymptomatic renal cyst here to f/u on recent MRCP performed in surveillance of incidentally-discovered pancreatic cyst and pancreatic duct dilation.    Pancreatic duct dilated  2 mm cyst on initial 11/2017 abdominal MRI has disappeared; stable 4 mm pancreatic duct dilation.  There are diffuse nonspecific abnormalities primarily in the side branches of the pancreatic duct which per radiologist's report are c/w early mild chronic pancreatitis.  Patient does not have clinical evidence of chronic pancreatitis  "such as characteristic abdominal pain, steatorrhea, or evidence of diabetes mellitus.  Does have ~ 10 lb weight loss in the last 6 months.  She does have some chronic epigastric tenderness which is present when constipated.  Will get hepatic panel to evaluate.  Potential etiologies of chronic pancreatitis would be alcohol consumption, genetic predisposition, recurrent acute pancreatitis, autoimmunity, pancreatic cancer or neuroendocrine tumor, idiopathic.  Patient does endorse excessive EtOH use.  Denies previous acute pancreatitis, no family h/o autoimmune condition, chronic pancreatitis.  However, in absence of symptoms it is difficult to say if pt will ever go on to develop chronic pancreatitis.  These findings may also represent alternative diagnosis vs. Benign anatomic variation.  Will refer to GI for their input on etiology and recs regarding how frequently to monitor the anatomy of the pancreas.  - Hepatic panel  - GASTROENTEROLOGY ADULT REFERRAL for ?need for ERCP/EUS, and if needs surveillance/how often.    HM:  Discussed EtOH use with pt.  Informed that EtOH is toxic to pancreas and liver, encouraged cessation or at least reduction in use.  Patient agreeable that she needs to \"make a change,\" planning to cut back her alcohol use.  Will follow-up on her progress.    RTC: pt will f/u after discussion with GI      Elena Nguyen, MS4    Teaching Physician  Disclosure:    I was present with the medical student who participated in the service and in the documentation of this note.  I have verified the history and personally performed the physical exam and medical decision making, and have verified the content of the note, which accurately reflects my assessment of the patient and the plan of care    Maria Elena Rice M.D.  Internal Medicine   pager [phone]      "

## 2018-07-19 NOTE — PATIENT INSTRUCTIONS
[facility] Medication Refill Request Information:  * Please contact your pharmacy regarding ANY request for medication refills.  ** [facility] Prescription Fax = [phone]  * Please allow 3 business days for routine medication refills.  * Please allow 5 business days for controlled substance medication refills.     [facility] Test Result notification information:  *You will be notified with in 7-10 days of your appointment day regarding the results of your test.  If you are on MyChart you will be notified as soon as the provider has reviewed the results and signed off on them.    [facility]: [phone]

## 2018-07-20 NOTE — TELEPHONE ENCOUNTER
[facility]     Referring provider:     Referred to: [facility]     Provider Requested: 7/19/2018     Referral Received: 7/19/20018     Records received: In EPIC      Images received: In EPIC    Evaluation for: Pancreatic duct dilation      Clinical History (per RN review):   MRI 7/18/2018:  MRCP: Borderline size of the pancreatic duct in the body measuring 4  mm. Ductal side branch visualization in the pancreatic tail. Slight  irregularity of the pancreatic duct in the distal body/proximal tail.  No biliary dilatation.    MD review date: 7/24/2018 sent to Dr. Cervantes for review   MD Decision for clinic consultation/Orders:            Referral updates/Patient contacted:

## 2018-07-26 NOTE — TELEPHONE ENCOUNTER
The patient was called in regard to the below information. The direct phone number was left for the patient to call back at their earliest convenience.     Patient needs to be scheduled for an EUS with Dr. Elaine BERRY RN Coordinator  Dr. Henry  [facility]

## 2018-07-26 NOTE — TELEPHONE ENCOUNTER
This RNCC called the patient in regard to the below information. The direct phone number was left for the patient to call back at their earliest convenience.     Patient is scheduled for EUS with Dr. Henry.    This RNCC discussed with the patient the need for a pre-op physical with her primary care provider.      Patient called and is amenable to plan as noted and aware of the following:  Procedure explained to patient.  This is a non-urgent procedure.   They can expect a call for date and time for procedure.   They will need a , someone to stay with them for 24 hours and to stay in town for 24 hours post procedure, rational explained.   Will get pre-op physical done locally and will fax a copy to us along with bringing a hard copy with them. Fax number given. [phone]     Blood thinner - Denies  ASA - Denies  Diabetic - Denies  A pre-op nurse will call 1-2 days prior to the procedure.   Is advised to be NPO/solid food at midnight before the procedure in the event the procedure time is moved up. Ok to drink clear liquids up to 4 hours prior to procedure.      Advised post procedure to start with clear liquids and advance diet slowly as tolerated.  It is normal to have a sore throat after the procedure.   May also have some muscle tenderness from positioning on the table.      Aware this RN will call within 2-3 days post procedure to see how they are doing.     Verbalized understanding of all instructions. All questions answered.   Contact information verified for future questions/concerns.

## 2018-07-27 ENCOUNTER — TELEPHONE (OUTPATIENT)
Dept: GASTROENTEROLOGY | Facility: CLINIC | Age: 63
End: 2018-07-27

## 2018-07-27 DIAGNOSIS — K86.89 DILATION OF PANCREATIC DUCT: Primary | ICD-10-CM

## 2018-08-07 ENCOUNTER — TELEPHONE (OUTPATIENT)
Dept: GASTROENTEROLOGY | Facility: CLINIC | Age: 63
End: 2018-08-07

## 2018-08-10 ENCOUNTER — TELEPHONE (OUTPATIENT)
Dept: GASTROENTEROLOGY | Facility: CLINIC | Age: 63
End: 2018-08-10

## 2018-08-13 ENCOUNTER — TELEPHONE (OUTPATIENT)
Dept: GASTROENTEROLOGY | Facility: CLINIC | Age: 63
End: 2018-08-13

## 2018-08-13 ENCOUNTER — OFFICE VISIT (OUTPATIENT)
Dept: FAMILY MEDICINE | Facility: CLINIC | Age: 63
End: 2018-08-13
Payer: COMMERCIAL

## 2018-08-13 VITALS
TEMPERATURE: 98.1 F | DIASTOLIC BLOOD PRESSURE: 76 MMHG | RESPIRATION RATE: 16 BRPM | WEIGHT: 127 LBS | BODY MASS INDEX: 22.5 KG/M2 | SYSTOLIC BLOOD PRESSURE: 134 MMHG | HEART RATE: 76 BPM

## 2018-08-13 DIAGNOSIS — Z01.818 PREOP GENERAL PHYSICAL EXAM: Primary | ICD-10-CM

## 2018-08-13 DIAGNOSIS — Z12.11 SCREEN FOR COLON CANCER: ICD-10-CM

## 2018-08-13 DIAGNOSIS — K86.89 DILATED PANCREATIC DUCT: ICD-10-CM

## 2018-08-13 PROCEDURE — 99214 OFFICE O/P EST MOD 30 MIN: CPT | Performed by: NURSE PRACTITIONER

## 2018-08-13 NOTE — MR AVS SNAPSHOT
After Visit Summary   8/13/2018    Delilah Lopes    MRN: [id number]           Patient Information     Date Of Birth          1955        Visit Information        Provider Department      8/13/2018 3:40 PM Martha Ledezma APRN CNP [facility]        Today's Diagnoses     Preop general physical exam    -  1    Screen for colon cancer          Care Instructions      Before Your Surgery      Call your surgeon if there is any change in your health. This includes signs of a cold or flu (such as a sore throat, runny nose, cough, rash or fever).    Do not smoke, drink alcohol or take over the counter medicine (unless your surgeon or primary care doctor tells you to) for the 24 hours before and after surgery.    If you take prescribed drugs: Follow your doctor s orders about which medicines to take and which to stop until after surgery.    Eating and drinking prior to surgery: follow the instructions from your surgeon    Take a shower or bath the night before surgery. Use the soap your surgeon gave you to gently clean your skin. If you do not have soap from your surgeon, use your regular soap. Do not shave or scrub the surgery site.  Wear clean pajamas and have clean sheets on your bed.           Follow-ups after your visit        Your next 10 appointments already scheduled     Aug 14, 2018   Procedure with Guru Delgado Henry MD   [facility], [place], Endoscopy ([facility], [facility])    [address] [address] [phone]           The [facility] is located on the corner of [address] and [address] on the [address]. It is easily accessible from virtually any point in the [place] area, via Simple[employer]ist and Im[employer].              Who to contact     If you have questions or need follow up information about today's clinic visit or your schedule please contact [facility] directly at [phone].  Normal or non-critical lab and imaging results will be communicated to you by MyChart, letter or phone within 4 business days after the clinic has received the results. If you do not hear from us within 7 days, please contact the clinic through [employer] or phone. If you have a critical or abnormal lab result, we will notify you by phone as soon as possible.  Submit refill requests through [employer] or call your pharmacy and they will forward the refill request to us. Please allow 3 business days for your refill to be completed.          Additional Information About Your Visit        [facility] Information     [employer] gives you secure access to your electronic health record. If you see a primary care provider, you can also send messages to your care team and make appointments. If you have questions, please call your primary care clinic.  If you do not have a primary care provider, please call [phone] and they will assist you.        Care EveryWhere ID     This is your Care EveryWhere ID. This could be used by other organizations to access your [place] medical records  [id number]        Your Vitals Were     Pulse Temperature Respirations Last Period BMI (Body Mass Index)       76 98.1  F (36.7  C) (Oral) 16 02/12/2006 22.5 kg/m2        Blood Pressure from Last 3 Encounters:   08/13/18 134/76   07/19/18 124/82   01/19/18 141/90    Weight from Last 3 Encounters:   08/13/18 127 lb (57.6 kg)   07/19/18 127 lb 14.4 oz (58 kg)   01/19/18 137 lb 6.4 oz (62.3 kg)              Today, you had the following     No orders found for display       Primary Care Provider Office Phone # Fax #    Jordan Kumar [phone]       [facility] [address]  [facility] [address]        Equal Access to Services     RAF CR : Denny Gardner, wachan lujaya, qaybta kaalmanadia smith, kellie murray. So [facility]  [phone].    ATENCIÓN: Si catalina moran, tiene a chakraborty disposición servicios gratuitos de asistencia lingüística. Gisselle lawson [phone].    We comply with applicable federal civil rights laws and [place] laws. We do not discriminate on the basis of race, color, national origin, age, disability, sex, sexual orientation, or gender identity.            Thank you!     Thank you for choosing [facility]  for your care. Our goal is always to provide you with excellent care. Hearing back from our patients is one way we can continue to improve our services. Please take a few minutes to complete the written survey that you may receive in the mail after your visit with us. Thank you!             Your Updated Medication List - Protect others around you: Learn how to safely use, store and throw away your medicines at www.disposemymeds.org.          This list is accurate as of 8/13/18  4:37 PM.  Always use your most recent med list.                   Brand Name Dispense Instructions for use Diagnosis    albuterol 108 (90 Base) MCG/ACT inhaler    VENTOLIN HFA    1 Inhaler    Inhale 2 puffs into the lungs every 6 hours as needed for shortness of breath / dyspnea    Mild persistent asthma       B COMPLEX PO      1 tablet orally at bedtime        FISH OIL PO      Take 2 capsules by mouth At Bedtime        lisinopril 5 MG tablet    PRINIVIL/ZESTRIL    90 tablet    Take 1 tablet (5 mg) by mouth daily    Essential hypertension       Multi-vitamin Tabs tablet   Generic drug:  multivitamin, therapeutic with minerals      1 TABLET DAILY at bedtime        Vitamin C 100 MG Tabs      Take 1,500 mg by mouth At Bedtime        VITAMIN C PO      Take 1,500 mg by mouth daily        VITAMIN D (CHOLECALCIFEROL) PO      Take 2,500 Units by mouth At Bedtime

## 2018-08-13 NOTE — PROGRESS NOTES
[address], [address]  [facility] [address]  [phone]  Dept: [phone]    PRE-OP EVALUATION:  Today's date: 2018    Delilah Lopes (: 1955) presents for pre-operative evaluation assessment as requested by Dr. Henry.  She requires evaluation and anesthesia risk assessment prior to undergoing surgery/procedure for treatment of dilated duct on pancreas .    Fax number for surgical facility:   Primary Physician: Jordan Kumar  Type of Anesthesia Anticipated: Monitor Anesthesia Care    Patient has a Health Care Directive or Living Will:  YES  has     Preop Questions 2018   Who is doing your surgery? [facility]   What are you having done? Endoscopic Ultrasound   Date of Surgery/Procedure: 2018   Facility or Hospital where procedure/surgery will be performed: [facility]   1.  Do you have a history of Heart attack, stroke, stent, coronary bypass surgery, or other heart surgery? No   2.  Do you ever have any pain or discomfort in your chest? No   3.  Do you have a history of  Heart Failure? No   4.   Are you troubled by shortness of breath when:  walking on a level surface, or up a slight hill, or at night? No   5.  Do you currently have a cold, bronchitis or other respiratory infection? No   6.  Do you have a cough, shortness of breath, or wheezing? No   7.  Do you sometimes get pains in the calves of your legs when you walk? No   8. Do you or anyone in your family have previous history of blood clots? YES - mother did with cancer (ovarian)   9.  Do you or does anyone in your family have a serious bleeding problem such as prolonged bleeding following surgeries or cuts? No   10. Have you ever had problems with anemia or been told to take iron pills? No   11. Have you had any abnormal blood loss such as black, tarry or bloody stools, or abnormal vaginal bleeding? No   12. Have you ever had a blood  transfusion? No   13. Have you or any of your relatives ever had problems with anesthesia? YES - gets real nauseous, vomits   14. Do you have sleep apnea, excessive snoring or daytime drowsiness? No   15. Do you have any prosthetic heart valves? No   16. Do you have prosthetic joints? No   17. Is there any chance that you may be pregnant? No         HPI:     HPI related to upcoming procedure: Persistent dilated pancreatic duct.  Plan for endoscopic US/EGD for further evaluation with GI.        See problem list for active medical problems.  Problems all longstanding and stable, except as noted/documented.  See ROS for pertinent symptoms related to these conditions.                                                                                                                                                          .    MEDICAL HISTORY:     Patient Active Problem List    Diagnosis Date Noted     Essential hypertension 2015     Priority: Medium     CARDIOVASCULAR SCREENING; LDL GOAL LESS THAN 160 10/31/2010     Priority: Medium     Cold sore 2009     Priority: Medium     Mild persistent asthma 2006     Priority: Medium     Stricture and stenosis of esophagus 2002     Priority: Medium     Urethral stricture 2002     Priority: Medium     Problem list name updated by automated process. Provider to review        Past Medical History:   Diagnosis Date     Essential hypertension 2015     Melanoma (H) 2016     Mild persistent asthma      Osteoarthritis      Stricture and stenosis of esophagus     Dx , [place] [facility]     Urethral stricture unspecified      Past Surgical History:   Procedure Laterality Date     C NONSPECIFIC PROCEDURE       x3 1980,1985,     HERNIA REPAIR      2 hernia repairs     HYSTERECTOMY, PAP NO LONGER INDICATED       STENT  2017    kidney     SURGICAL PATHOLOGY EXAM  2017     Current Outpatient Prescriptions   Medication Sig Dispense  Refill     albuterol (VENTOLIN HFA) 108 (90 BASE) MCG/ACT Inhaler Inhale 2 puffs into the lungs every 6 hours as needed for shortness of breath / dyspnea 1 Inhaler 1     Ascorbic Acid (VITAMIN C PO) Take 1,500 mg by mouth daily       Ascorbic Acid (VITAMIN C) 100 MG TABS Take 1,500 mg by mouth At Bedtime        B COMPLEX OR 1 tablet orally at bedtime       lisinopril (PRINIVIL/ZESTRIL) 5 MG tablet Take 1 tablet (5 mg) by mouth daily 90 tablet 3     MULTI-VITAMIN OR TABS 1 TABLET DAILY at bedtime       Omega-3 Fatty Acids (FISH OIL PO) Take 2 capsules by mouth At Bedtime       VITAMIN D, CHOLECALCIFEROL, PO Take 2,500 Units by mouth At Bedtime       OTC products: None, except as noted above    Allergies   Allergen Reactions     Nicotiana Tabacum      Other reaction(s): Breathing Difficulty     Amoxicillin      rash     Cat Hair Extract      Cat Hair [Cats]      Sulfa Drugs Rash     Ultram [Tramadol Hcl]      Headache  Weirdness  And sick nasuea, vomiting      Latex Allergy: NO    Social History   Substance Use Topics     Smoking status: Never Smoker     Smokeless tobacco: Never Used     Alcohol use 3.0 oz/week     5 Cans of beer per week     History   Drug Use No       REVIEW OF SYSTEMS:   Constitutional, neuro, ENT, endocrine, pulmonary, cardiac, gastrointestinal, genitourinary, musculoskeletal, integument and psychiatric systems are negative, except as otherwise noted.    EXAM:   /76 (BP Location: Right arm, Patient Position: Sitting, Cuff Size: Adult Regular)  Pulse 76  Temp 98.1  F (36.7  C) (Oral)  Resp 16  Wt 127 lb (57.6 kg)  LMP 02/12/2006  BMI 22.5 kg/m2    GENERAL APPEARANCE: healthy, alert and no distress     EYES: EOMI, PERRL     HENT: ear canals and TM's normal and nose and mouth without ulcers or lesions     NECK: no adenopathy, no asymmetry, masses, or scars and thyroid normal to palpation     RESP: lungs clear to auscultation - no rales, rhonchi or wheezes     CV: regular rates and rhythm,  normal S1 S2, no S3 or S4 and no murmur, click or rub     ABDOMEN:  soft, nontender, no HSM or masses and bowel sounds normal     MS: extremities normal- no gross deformities noted, no evidence of inflammation in joints, FROM in all extremities.     SKIN: no suspicious lesions or rashes     NEURO: Normal strength and tone, sensory exam grossly normal, mentation intact and speech normal     PSYCH: mentation appears normal. and affect normal/bright     LYMPHATICS: No cervical adenopathy    DIAGNOSTICS:   EKG: Not indicated due to non-vascular surgery and low risk of event (age <65 and without cardiac risk factors)    Recent Labs   Lab Test  12/14/17   1505  11/30/17   1129   10/03/17   1526   07/09/17   2139   HGB   --    --    --   13.0   --   12.8   PLT   --    --    --   300   --   312   NA  140  138   < >  136   --   141   POTASSIUM  3.5  3.5   < >  2.8*   < >  3.1*   CR  0.74  0.64   < >  0.87   --   0.71    < > = values in this interval not displayed.        IMPRESSION:   Reason for surgery/procedure: dilated pancreatic duct   Diagnosis/reason for consult: preoperative evaluation     The proposed surgical procedure is considered LOW risk.    REVISED CARDIAC RISK INDEX  The patient has the following serious cardiovascular risks for perioperative complications such as (MI, PE, VFib and 3  AV Block):  No serious cardiac risks  INTERPRETATION: 0 risks: Class I (very low risk - 0.4% complication rate)    The patient has the following additional risks for perioperative complications:  No identified additional risks    (Z01.818) Preop general physical exam  (primary encounter diagnosis)  Comment:   Plan:     (K86.89) Dilated pancreatic duct  Comment:   Plan: approval for surgery/procedure    (Z12.11) Screen for colon cancer  Comment:   Plan: patient reports this is up to date; KALYAN signed for records from [facility]      RECOMMENDATIONS:         --Patient is to take all scheduled medications on the day of surgery EXCEPT for  modifications listed below.    ACE Inhibitor or Angiotensin Receptor Blocker (ARB) Use  Ace inhibitor or Angiotensin Receptor Blocker (ARB) and should HOLD this medication for the 24 hours prior to surgery.      APPROVAL GIVEN to proceed with proposed procedure, without further diagnostic evaluation       Signed Electronically by: MAGDALENA Cordon CNP    Copy of this evaluation report is provided to requesting physician.    [place] Preop Guidelines    Revised Cardiac Risk Index

## 2018-08-13 NOTE — TELEPHONE ENCOUNTER
Patient scheduled for EUS     Indication for procedure. Dilation of pancreatic duct    Referring Provider.      ? No     Arrival time verified? Patient to arrive at 930 am     Facility location verified? [address]     Instructions given regarding prep and procedure. Transportation policy reviewed and verbalized understanding.     Prep Type NPO 8 hours     Are you taking any anticoagulants or blood thinners? Denies     Instructions given? Yes     Electronic implanted devices? Denies     Pre procedure teaching completed? Yes    Transportation from procedure? Yes,      H&P / Pre op physical completed? Scheduled today 8/13     Jose Diop RN

## 2018-08-14 ENCOUNTER — SURGERY (OUTPATIENT)
Age: 63
End: 2018-08-14

## 2018-08-14 ENCOUNTER — HOSPITAL ENCOUNTER (OUTPATIENT)
Facility: CLINIC | Age: 63
Discharge: HOME | End: 2018-08-14
Attending: INTERNAL MEDICINE | Admitting: INTERNAL MEDICINE
Payer: COMMERCIAL

## 2018-08-14 ENCOUNTER — ANESTHESIA (OUTPATIENT)
Dept: GASTROENTEROLOGY | Facility: CLINIC | Age: 63
End: 2018-08-14
Payer: COMMERCIAL

## 2018-08-14 ENCOUNTER — ANESTHESIA EVENT (OUTPATIENT)
Dept: GASTROENTEROLOGY | Facility: CLINIC | Age: 63
End: 2018-08-14
Payer: COMMERCIAL

## 2018-08-14 VITALS
OXYGEN SATURATION: 99 % | WEIGHT: 127.7 LBS | RESPIRATION RATE: 15 BRPM | BODY MASS INDEX: 22.62 KG/M2 | HEART RATE: 78 BPM | SYSTOLIC BLOOD PRESSURE: 167 MMHG | DIASTOLIC BLOOD PRESSURE: 99 MMHG | TEMPERATURE: 97.8 F

## 2018-08-14 PROCEDURE — 25000128 H RX IP 250 OP 636: Performed by: NURSE ANESTHETIST, CERTIFIED REGISTERED

## 2018-08-14 PROCEDURE — 25000132 ZZH RX MED GY IP 250 OP 250 PS 637: Performed by: INTERNAL MEDICINE

## 2018-08-14 PROCEDURE — 37000008 ZZH ANESTHESIA TECHNICAL FEE, 1ST 30 MIN: Performed by: INTERNAL MEDICINE

## 2018-08-14 PROCEDURE — 37000009 ZZH ANESTHESIA TECHNICAL FEE, EACH ADDTL 15 MIN: Performed by: INTERNAL MEDICINE

## 2018-08-14 PROCEDURE — 43259 EGD US EXAM DUODENUM/JEJUNUM: CPT | Performed by: INTERNAL MEDICINE

## 2018-08-14 PROCEDURE — 25000125 ZZHC RX 250: Performed by: NURSE ANESTHETIST, CERTIFIED REGISTERED

## 2018-08-14 RX ORDER — ONDANSETRON 4 MG/1
4 TABLET, ORALLY DISINTEGRATING ORAL EVERY 30 MIN PRN
Status: DISCONTINUED | OUTPATIENT
Start: 2018-08-14 | End: 2018-08-14 | Stop reason: HOSPADM

## 2018-08-14 RX ORDER — ONDANSETRON 2 MG/ML
4 INJECTION INTRAMUSCULAR; INTRAVENOUS EVERY 30 MIN PRN
Status: DISCONTINUED | OUTPATIENT
Start: 2018-08-14 | End: 2018-08-14 | Stop reason: HOSPADM

## 2018-08-14 RX ORDER — NALOXONE HYDROCHLORIDE 0.4 MG/ML
.1-.4 INJECTION, SOLUTION INTRAMUSCULAR; INTRAVENOUS; SUBCUTANEOUS
Status: DISCONTINUED | OUTPATIENT
Start: 2018-08-14 | End: 2018-08-14 | Stop reason: HOSPADM

## 2018-08-14 RX ORDER — MEPERIDINE HYDROCHLORIDE 50 MG/ML
12.5 INJECTION INTRAMUSCULAR; INTRAVENOUS; SUBCUTANEOUS
Status: DISCONTINUED | OUTPATIENT
Start: 2018-08-14 | End: 2018-08-14 | Stop reason: HOSPADM

## 2018-08-14 RX ORDER — FENTANYL CITRATE 50 UG/ML
INJECTION, SOLUTION INTRAMUSCULAR; INTRAVENOUS PRN
Status: DISCONTINUED | OUTPATIENT
Start: 2018-08-14 | End: 2018-08-14

## 2018-08-14 RX ORDER — SODIUM CHLORIDE, SODIUM LACTATE, POTASSIUM CHLORIDE, CALCIUM CHLORIDE 600; 310; 30; 20 MG/100ML; MG/100ML; MG/100ML; MG/100ML
INJECTION, SOLUTION INTRAVENOUS CONTINUOUS PRN
Status: DISCONTINUED | OUTPATIENT
Start: 2018-08-14 | End: 2018-08-14

## 2018-08-14 RX ORDER — PROPOFOL 10 MG/ML
INJECTION, EMULSION INTRAVENOUS CONTINUOUS PRN
Status: DISCONTINUED | OUTPATIENT
Start: 2018-08-14 | End: 2018-08-14

## 2018-08-14 RX ORDER — SIMETHICONE
LIQUID (ML) MISCELLANEOUS PRN
Status: DISCONTINUED | OUTPATIENT
Start: 2018-08-14 | End: 2018-08-14 | Stop reason: HOSPADM

## 2018-08-14 RX ORDER — SODIUM CHLORIDE, SODIUM LACTATE, POTASSIUM CHLORIDE, CALCIUM CHLORIDE 600; 310; 30; 20 MG/100ML; MG/100ML; MG/100ML; MG/100ML
INJECTION, SOLUTION INTRAVENOUS CONTINUOUS
Status: DISCONTINUED | OUTPATIENT
Start: 2018-08-14 | End: 2018-08-14 | Stop reason: HOSPADM

## 2018-08-14 RX ADMIN — SODIUM CHLORIDE, POTASSIUM CHLORIDE, SODIUM LACTATE AND CALCIUM CHLORIDE: 600; 310; 30; 20 INJECTION, SOLUTION INTRAVENOUS at 09:55

## 2018-08-14 RX ADMIN — MIDAZOLAM 2 MG: 1 INJECTION INTRAMUSCULAR; INTRAVENOUS at 10:00

## 2018-08-14 RX ADMIN — Medication 2 ML: at 09:43

## 2018-08-14 RX ADMIN — TOPICAL ANESTHETIC 1 SPRAY: 200 SPRAY DENTAL; PERIODONTAL at 10:00

## 2018-08-14 RX ADMIN — PROPOFOL 125 MCG/KG/MIN: 10 INJECTION, EMULSION INTRAVENOUS at 10:02

## 2018-08-14 RX ADMIN — FENTANYL CITRATE 50 MCG: 50 INJECTION, SOLUTION INTRAMUSCULAR; INTRAVENOUS at 10:03

## 2018-08-14 ASSESSMENT — ASTHMA QUESTIONNAIRES: ACT_TOTALSCORE: 23

## 2018-08-14 NOTE — IP AVS SNAPSHOT
MRN:[id number]                      After Visit Summary   8/14/2018    Delilah Lopes    MRN: [id number]           Thank you!     Thank you for choosing [facility] for your care. Our goal is always to provide you with excellent care. Hearing back from our patients is one way we can continue to improve our services. Please take a few minutes to complete the written survey that you may receive in the mail after you visit with us. Thank you!        Patient Information     Date Of Birth          1955        About your hospital stay     You were admitted on:  August 14, 2018 You last received care in the:  [facility], [place], Endoscopy    You were discharged on:  August 14, 2018       Who to Call     For medical emergencies, please call 911.  For non-urgent questions about your medical care, please call your primary care provider or clinic, [phone]  For questions related to your surgery, please call your surgery clinic        Attending Provider     Provider Guru Delgado Peña MD Gastroenterology       Primary Care Provider Office Phone # Fax #    Jordan Kumar [phone]      Further instructions from your care team       [facility] Endoscopic Ultrasound with Monitored Anesthesia Care by Dr Guru Henry  For 24 hours after your procedure  Sedation:  1. Get plenty of rest. A responsible adult must stay with you for at least 24 hours after you leave the hospital.   2. Do not drive or use heavy equipment. If you have weakness or tingling, don't drive or use heavy equipment until this feeling goes away.  3. Do not drink alcohol.  4. Avoid strenuous or risky activities. Ask for help when climbing stairs.   5. You may feel lightheaded. IF so, sit for a few minutes before standing. Have someone help you get up.   6. If you have nausea (feel sick to your stomach): Drink only clear liquids such as apple juice, ginger ale, broth or 7-Up. Rest may also  help. Be sure to drink enough fluids. Move to a regular diet as you feel able.  7. You may have a slight fever. Call the doctor if your fever is over 100 F (37.7 C) (taken under the tongue) or lasts longer than 24 hours.  8. You may have a dry mouth, a sore throat, muscle aches or trouble sleeping. These should go away after 24 hours.  9. Do not make important or legal decisions.   Procedural:  1. Start with sips of water. When your gag reflex has returned, you may return to your normal diet, medicines, and light exercise.  2. Some bloating is normal. You may have large burps or pass air.  3. You may have a sore throat for 2 to 3 days. If so, it may help to:    Use sore throat lozenges.    Gargle as need with salt water up to 4 times a day. Mix 1 cup of warm water with 1 teaspoon of salt. Do not swallow.  4. You may take Tylenol (acetaminophen) for pain unless your doctor has told you not to.   Call right away if you have:  1. Unusual pain in belly or chest pain not relieved with passing air.  2. Severe throat pain or trouble swallowing.  3. Black stools (tar-like looking bowel movement).  4. Signs of infection (fever).  5. It has been over 8 to 10 hours since the procedure and you are still not able to urinate (pass water).  6. Headache for over 24 hours.  Follow-up:  __X__ If you have severe pain, bleeding, vomit blood, or shortness of breath, go to an emergency room.  If you have questions, call:  Endoscopy: Monday to Friday, 7 a.m. to 4:30 p.m. [phone] (We may have to call you back)  After hours: Hospital  [phone] (Ask for the GI fellow on call)        Pending Results     No orders found from 8/12/2018 to 8/15/2018.            Admission Information     Date & Time Provider Department Dept. Phone    8/14/2018 Guru Delgado Henry MD [facility], [place], Endoscopy [phone]      Your Vitals Were     Blood Pressure Pulse Temperature Respirations Weight Last Period    135/89 78 97.8   F (36.6  C) (Oral) 13 57.9 kg (127 lb 11.2 oz) 02/12/2006    Pulse Oximetry BMI (Body Mass Index)                99% 22.62 kg/m2          [employer] Information     [employer] gives you secure access to your electronic health record. If you see a primary care provider, you can also send messages to your care team and make appointments. If you have questions, please call your primary care clinic.  If you do not have a primary care provider, please call [phone] and they will assist you.        Care EveryWhere ID     This is your Care EveryWhere ID. This could be used by other organizations to access your [place] medical records  [id number]        Equal Access to Services     RAF CR : Denny Gardner, veronica bagley, radha smith, kellie murray. So [facility] [phone].    ATENCIÓN: Si habla español, tiene a chakraborty disposición servicios gratuitos de asistencia lingüística. Llame al [phone].    We comply with applicable federal civil rights laws and [place] laws. We do not discriminate on the basis of race, color, national origin, age, disability, sex, sexual orientation, or gender identity.               Review of your medicines      UNREVIEWED medicines. Ask your doctor about these medicines        Dose / Directions    albuterol 108 (90 Base) MCG/ACT inhaler   Commonly known as:  VENTOLIN HFA   Used for:  Mild persistent asthma        Dose:  2 puff   Inhale 2 puffs into the lungs every 6 hours as needed for shortness of breath / dyspnea   Quantity:  1 Inhaler   Refills:  1       B COMPLEX PO        1 tablet orally at bedtime   Refills:  0       FISH OIL PO        Dose:  2 capsule   Take 2 capsules by mouth At Bedtime   Refills:  0       lisinopril 5 MG tablet   Commonly known as:  PRINIVIL/ZESTRIL   Used for:  Essential hypertension        Dose:  5 mg   Take 1 tablet (5 mg) by mouth daily   Quantity:  90 tablet   Refills:  3       Multi-vitamin Tabs  tablet   Generic drug:  multivitamin, therapeutic with minerals        1 TABLET DAILY at bedtime   Refills:  0       Vitamin C 100 MG Tabs        Dose:  1500 mg   Take 1,500 mg by mouth At Bedtime   Refills:  0       VITAMIN C PO        Dose:  1500 mg   Take 1,500 mg by mouth daily   Refills:  0       VITAMIN D (CHOLECALCIFEROL) PO        Dose:  2500 Units   Take 2,500 Units by mouth At Bedtime   Refills:  0                Protect others around you: Learn how to safely use, store and throw away your medicines at www.disposemymeds.org.             Medication List: This is a list of all your medications and when to take them. Check marks below indicate your daily home schedule. Keep this list as a reference.      Medications           Morning Afternoon Evening Bedtime As Needed    albuterol 108 (90 Base) MCG/ACT inhaler   Commonly known as:  VENTOLIN HFA   Inhale 2 puffs into the lungs every 6 hours as needed for shortness of breath / dyspnea                                B COMPLEX PO   1 tablet orally at bedtime                                FISH OIL PO   Take 2 capsules by mouth At Bedtime                                lisinopril 5 MG tablet   Commonly known as:  PRINIVIL/ZESTRIL   Take 1 tablet (5 mg) by mouth daily                                Multi-vitamin Tabs tablet   1 TABLET DAILY at bedtime   Generic drug:  multivitamin, therapeutic with minerals                                Vitamin C 100 MG Tabs   Take 1,500 mg by mouth At Bedtime                                VITAMIN C PO   Take 1,500 mg by mouth daily                                VITAMIN D (CHOLECALCIFEROL) PO   Take 2,500 Units by mouth At Bedtime

## 2018-08-14 NOTE — IP AVS SNAPSHOT
[facility], [place], Endoscopy    [address] [address]    Phone:  [phone]                                       After Visit Summary   8/14/2018    Delilah Lopes    MRN: [id number]           After Visit Summary Signature Page     I have received my discharge instructions, and my questions have been answered. I have discussed any challenges I see with this plan with the nurse or doctor.    ..........................................................................................................................................  Patient/Patient Representative Signature      ..........................................................................................................................................  Patient Representative Print Name and Relationship to Patient    ..................................................               ................................................  Date                                            Time    ..........................................................................................................................................  Reviewed by Signature/Title    ...................................................              ..............................................  Date                                                            Time

## 2018-08-14 NOTE — ANESTHESIA POSTPROCEDURE EVALUATION
Patient: Delilah Lopes    Procedure(s):  EUS - Wound Class: II-Clean Contaminated    Diagnosis:Dilation of pancreatic duct   Diagnosis Additional Information: No value filed.    Anesthesia Type:  MAC    Note:  Anesthesia Post Evaluation    Patient location during evaluation: PACU  Patient participation: Able to fully participate in evaluation  Level of consciousness: awake  Pain management: adequate  Airway patency: patent  Cardiovascular status: acceptable  Respiratory status: acceptable  Hydration status: acceptable  PONV: none     Anesthetic complications: None          Last vitals:  Vitals:    08/14/18 1042 08/14/18 1044 08/14/18 1045   BP:      Pulse:      Resp: 19 17 18   Temp:      SpO2: 98% 99% 98%         Electronically Signed By: Didier Martinez MD  August 14, 2018  10:58 AM

## 2018-08-14 NOTE — ANESTHESIA CARE TRANSFER NOTE
Patient: Delilah Lopes    Procedure(s):  EUS - Wound Class: II-Clean Contaminated    Diagnosis: Dilation of pancreatic duct   Diagnosis Additional Information: No value filed.    Anesthesia Type:   MAC     Note:  Airway :Nasal Cannula  Patient transferred to:Phase II  Handoff Report: Identifed the Patient, Identified the Reponsible Provider, Reviewed the pertinent medical history, Discussed the surgical course, Reviewed Intra-OP anesthesia mangement and issues during anesthesia, Set expectations for post-procedure period and Allowed opportunity for questions and acknowledgement of understanding      Vitals: (Last set prior to Anesthesia Care Transfer)    CRNA VITALS  8/14/2018 1004 - 8/14/2018 1034      8/14/2018             SpO2: 100 %    EKG: NSR                Electronically Signed By: MAGDALENA Preston CRNA  August 14, 2018  10:34 AM

## 2018-08-14 NOTE — DISCHARGE INSTRUCTIONS
[facility] Endoscopic Ultrasound with Monitored Anesthesia Care by Dr Guru Henry  For 24 hours after your procedure  Sedation:  1. Get plenty of rest. A responsible adult must stay with you for at least 24 hours after you leave the hospital.   2. Do not drive or use heavy equipment. If you have weakness or tingling, don't drive or use heavy equipment until this feeling goes away.  3. Do not drink alcohol.  4. Avoid strenuous or risky activities. Ask for help when climbing stairs.   5. You may feel lightheaded. IF so, sit for a few minutes before standing. Have someone help you get up.   6. If you have nausea (feel sick to your stomach): Drink only clear liquids such as apple juice, ginger ale, broth or 7-Up. Rest may also help. Be sure to drink enough fluids. Move to a regular diet as you feel able.  7. You may have a slight fever. Call the doctor if your fever is over 100 F (37.7 C) (taken under the tongue) or lasts longer than 24 hours.  8. You may have a dry mouth, a sore throat, muscle aches or trouble sleeping. These should go away after 24 hours.  9. Do not make important or legal decisions.   Procedural:  1. Start with sips of water. When your gag reflex has returned, you may return to your normal diet, medicines, and light exercise.  2. Some bloating is normal. You may have large burps or pass air.  3. You may have a sore throat for 2 to 3 days. If so, it may help to:    Use sore throat lozenges.    Gargle as need with salt water up to 4 times a day. Mix 1 cup of warm water with 1 teaspoon of salt. Do not swallow.  4. You may take Tylenol (acetaminophen) for pain unless your doctor has told you not to.   Call right away if you have:  1. Unusual pain in belly or chest pain not relieved with passing air.  2. Severe throat pain or trouble swallowing.  3. Black stools (tar-like looking bowel movement).  4. Signs of infection (fever).  5. It has been over 8 to 10 hours since the procedure and you are still not  able to urinate (pass water).  6. Headache for over 24 hours.  Follow-up:  __X__ If you have severe pain, bleeding, vomit blood, or shortness of breath, go to an emergency room.  If you have questions, call:  Endoscopy: Monday to Friday, 7 a.m. to 4:30 p.m. [phone] (We may have to call you back)  After hours: Hospital  [phone] (Ask for the GI fellow on call)

## 2018-08-14 NOTE — ANESTHESIA PREPROCEDURE EVALUATION
Anesthesia Evaluation              Anesthesia Plan      History & Physical Review  History and physical reviewed and following examination; no interval change.    ASA Status:  3 .    NPO Status:  > 6 hours    Plan for MAC with Intravenous induction. Maintenance will be TIVA.  Reason for MAC:  Difficulty with conscious sedation (QS)         Postoperative Care      Consents  Anesthetic plan, risks, benefits and alternatives discussed with:  Patient..        ANESTHESIA PREOP EVALUATION    NPO Status:  yES, NPO    Procedure: Procedure(s):  EUS - Wound Class: II-Clean Contaminated    HPI: Presents for EUS.     PMHx/PSHx/ROS:  PAST MEDICAL HISTORY:   Past Medical History:   Diagnosis Date     Essential hypertension 2015     Melanoma (H) 2016     Mild persistent asthma      Osteoarthritis      Stricture and stenosis of esophagus     Dx , [facility]     Urethral stricture unspecified        PAST SURGICAL HISTORY:   Past Surgical History:   Procedure Laterality Date     C NONSPECIFIC PROCEDURE       x3 ,,     HERNIA REPAIR      2 hernia repairs     HYSTERECTOMY, PAP NO LONGER INDICATED       STENT  2017    kidney     SURGICAL PATHOLOGY EXAM  2017       FAMILY HISTORY:   Family History   Problem Relation Age of Onset     Cancer Mother      ovarian cancer     HEART DISEASE Father      heart attacks with bypass     Hypertension Father      Lipids Father      C.A.D. Father      Cancer Maternal Grandmother      breast cancer     C.A.D. Paternal Grandfather      Cancer Paternal Aunt      cervicle cancer     Cancer Paternal Aunt      uterin cancer     Diabetes Paternal Aunt      Hypertension Brother      Cancer Son      testicular         Past Anes Hx: No personal or family h/o anesthesia problems    Soc Hx:   Tobacco:   EtOH:     Allergies:   Allergies   Allergen Reactions     Nicotiana Tabacum      Other reaction(s): Breathing Difficulty     Amoxicillin      rash     Cat Hair  Extract      Cat Hair [Cats]      Sulfa Drugs Rash     Ultram [Tramadol Hcl]      Headache  Weirdness  And sick nasuea, vomiting       Meds:   Prescriptions Prior to Admission   Medication Sig Dispense Refill Last Dose     albuterol (VENTOLIN HFA) 108 (90 BASE) MCG/ACT Inhaler Inhale 2 puffs into the lungs every 6 hours as needed for shortness of breath / dyspnea 1 Inhaler 1 Past Month     Ascorbic Acid (VITAMIN C PO) Take 1,500 mg by mouth daily   8/13/2018     Ascorbic Acid (VITAMIN C) 100 MG TABS Take 1,500 mg by mouth At Bedtime    8/13/2018     B COMPLEX OR 1 tablet orally at bedtime   8/13/2018     lisinopril (PRINIVIL/ZESTRIL) 5 MG tablet Take 1 tablet (5 mg) by mouth daily 90 tablet 3 8/13/2018     MULTI-VITAMIN OR TABS 1 TABLET DAILY at bedtime   8/13/2018     Omega-3 Fatty Acids (FISH OIL PO) Take 2 capsules by mouth At Bedtime   8/13/2018     VITAMIN D, CHOLECALCIFEROL, PO Take 2,500 Units by mouth At Bedtime   8/13/2018       No current outpatient prescriptions on file.       Physical Exam:  VS: T 97.6, P Data Unavailable, /100, R 20, SpO2 100% Weight   Wt Readings from Last 2 Encounters:   08/14/18 57.9 kg (127 lb 11.2 oz)   08/13/18 57.6 kg (127 lb)         Airway: MP 2, TM>3FB, Neck full ROM  Dentition: no loose teeth  Heart: RRR  Lungs: CTAB      BMP:  Lab Results   Component Value Date     12/14/2017      Lab Results   Component Value Date    POTASSIUM 3.5 12/14/2017     Lab Results   Component Value Date    CHLORIDE 105 12/14/2017     Lab Results   Component Value Date    SARITA 8.8 12/14/2017     Lab Results   Component Value Date    CO2 28 12/14/2017     Lab Results   Component Value Date    BUN 10 12/14/2017     Lab Results   Component Value Date    CR 0.74 12/14/2017     Lab Results   Component Value Date    GLC 92 12/14/2017        CBC:  Lab Results   Component Value Date    WBC 6.9 10/03/2017     Lab Results   Component Value Date    HGB 13.0 10/03/2017     Lab Results   Component  Value Date    HCT 39.2 10/03/2017     Lab Results   Component Value Date     10/03/2017        Coags/Type and Screen  No results found for: INR  No results found for: PT  Type and Screen:      Assessment/Plan:  - ASA 3  - MAC with standard ASA monitors, IV induction, balanced anesthetic  - PIV  - Antibiotics per surgery  - PONV prophylaxis  - Blood products available, possible administration discussed with patient  Didier Martinez MD    8/14/2018  9:54 AM

## 2018-08-14 NOTE — OR NURSING
EUS under MAC.  No interventions.  CRNA To document meds, vitals, sedation score and will transport to recovery

## 2018-08-16 LAB — UPPER EUS: NORMAL

## 2018-08-17 ENCOUNTER — CARE COORDINATION (OUTPATIENT)
Dept: GASTROENTEROLOGY | Facility: CLINIC | Age: 63
End: 2018-08-17

## 2018-08-17 NOTE — PROGRESS NOTES
Post EUS (8/14/2018) with Dr. Henry: Follow-up    Post procedure recommendations: - Observe patient in GI recovery unit for ongoing care.   - Discussed with patient that EUS findings were indeterminate for chronic pancreatitis, especially she did not have a preceding history of acute pancreatitis   - Will recommend panc-bili clinic follow up to discuss further management in 6 months     Patient states: Patient was called to assess how she is doing after her procedure. The direct phone number was left in a voicemail with a request for the patient to call back at her earliest convenience.     Orders placed: None at this time.     Mirella BERRY RN Coordinator  Dr. Henry  [facility]  [phone]

## 2018-11-11 DIAGNOSIS — I10 ESSENTIAL HYPERTENSION: ICD-10-CM

## 2018-11-12 RX ORDER — LISINOPRIL 5 MG/1
TABLET ORAL
Qty: 90 TABLET | Refills: 3 | Status: SHIPPED | OUTPATIENT
Start: 2018-11-12 | End: 2019-07-18

## 2018-12-06 ENCOUNTER — OFFICE VISIT (OUTPATIENT)
Dept: GASTROENTEROLOGY | Facility: CLINIC | Age: 63
End: 2018-12-06
Payer: COMMERCIAL

## 2018-12-06 VITALS
OXYGEN SATURATION: 97 % | BODY MASS INDEX: 21.58 KG/M2 | DIASTOLIC BLOOD PRESSURE: 90 MMHG | SYSTOLIC BLOOD PRESSURE: 141 MMHG | HEART RATE: 80 BPM | WEIGHT: 126.4 LBS | HEIGHT: 64 IN | TEMPERATURE: 98.6 F

## 2018-12-06 DIAGNOSIS — K86.89 DILATED PANCREATIC DUCT: Primary | ICD-10-CM

## 2018-12-06 ASSESSMENT — PAIN SCALES - GENERAL: PAINLEVEL: NO PAIN (0)

## 2018-12-06 NOTE — LETTER
12/6/2018       RE: Delilah Lopes  [address] [place] [address]     Dear Colleague,    Thank you for referring your patient, Delilah Lopes, to the [facility] at [facility]. Please see a copy of my visit note below.    REFERRING PHYSICIAN:  Jrodan Kumar MD; Maria Elena Rice MD.       REASON FOR CONSULTATION:  Dilated pancreatic duct on MRCP.      HISTORY OF PRESENT ILLNESS:    This is a very pleasant 63-year-old white female with a history of hypertension and asthma who is being followed for a right-sided renal mass since 2008.  On her surveillance MRI they noted dilated pancreatic duct.  The pancreatic duct was shown to have minimal irregular dilatation with multiple foci branch ectasia.  Since she had a pound weight loss we ended up performing an endoscopic ultrasound.  During the endoscopic ultrasound a periampullary diverticulum was identified right adjacent to the major papilla and the pancreatic duct was prominent at 4 mm and was thought to be secondary to the periampullary diverticulum.  There was some suggestion of a stricture in the neck region with a visible side branches.  Overall, she had 3 out of 9 standard endosonographic features which was indeterminate for chronic noncalcific pancreatitis.  She is here for followup post-procedure.         She has been doing really well.  She denies nausea, vomiting, abdominal pain.  Her main symptom is constipation and she tries alternative medicine for the same and she has 1 bowel movement every other day and 1, occasionally 1 bowel movement every day.  She denies melena, hematochezia, hematemesis.  She has had a colonoscopy in 2011 and is due for a screening colonoscopy in 2021.  Of note, her weight has remained stable, and her appetite remains stable.  She has an extensive history of cancer in her family, including ovarian in the mother and grandmother and colon cancer in 2  paternal uncles and she had been tested for all genetic mutations and was noted to be negative for BRCA 1 and BRCA 2.   She denies dysphagia or odynophagia.      PAST MEDICAL HISTORY:   1.  Mild persistent asthma.   2.  Melanoma.   3.  Hypertension.   4.  Osteoarthritis.   5.  Ureteral stricture and stenosis of the esophagus.   6.  Obese.        PAST SURGICAL HISTORY:   1.  Hysterectomy.   2.  EGD.      FAMILY HISTORY:  A family history of colon cancer in 2 paternal uncles.  A family history of ovarian cancer in mother and grandmother.  History of heart disease, hypertension, hyperlipidemia, coronary artery disease in father.      ALLERGIES:  Amoxicillin, sulfa drugs, Ultram.      REVIEW OF SYSTEMS:  A complete 10-point review of system was performed and noted to be negative except as above.      PHYSICAL EXAMINATION:   VITAL SIGNS:  Blood pressure of 141/90, temperature of 98.6, heart rate of 80 per minute, weight of 57.3 kg.   GENERAL:  She is not in acute distress.   HEAD AND NECK:  No pallor, no icterus.   CHEST:  Lungs are clear to auscultation.   CARDIOVASCULAR:  S1, S2 heard, rate and rhythm are regular.   ABDOMEN:  Soft, nontender, nondistended.  Bowel sounds are heard.   NEUROLOGIC:  Alert, awake, oriented to time, place and person.   EXTREMITIES:  No pedal edema.        MRCP, MRI, which was done in 07/2018 showed minimal irregular pancreatic ductal dilation along with multifocal side branch ectasia.  Findings are most prominent in the pancreatic tail region and previous noted cyst in the pancreatic body was no longer visualized.      ASSESSMENT AND PLAN:  This is a 63-year-old white female with past medical history significant for hypertension, mild persistent asthma who underwent an MRI for surveillance of a right-sided renal mass and was noted to have a dilated pancreatic duct and she had a 10-pound intentional weight loss.  EUS showed a periampullary diverticulum and a dilated pancreatic duct was  attributed to a periampullary diverticulum.  She is here for a post-procedure followup and is completely asymptomatic for the past 5 months.        RECOMMENDATIONS:    1.  We reassured the patient that her weight has been stable and her pancreatic duct which was prominently dilated (4 mm in the region of the head) was most likely due to a diverticulum.  She had visible side branches and a possible stricture in the pancreatic neck.  Altogether, she had 3 out of 01/09 standard changes of which were indeterminate for chronic pancreatitis, and given how asymptomatic she is, we will not recommend pancreatic enzymes at this point.     2.  The patient is due for a colonoscopy in 2021.  Would recommend patient for surveillance colonoscopy in 2021.   3.  Constipation.  We encouraged Senna and Colace which the patient has been  on an outpatient basis.   4.  No further followup is needed.      A total of 40 minutes were spent with more than 50% of the time in counseling.         Guru Elaine MD

## 2018-12-06 NOTE — PROGRESS NOTES
REFERRING PHYSICIAN:  Jordan Kumar MD; Maria Elena Rice MD.       REASON FOR CONSULTATION:  Dilated pancreatic duct on MRCP.      HISTORY OF PRESENT ILLNESS:    This is a very pleasant 63-year-old white female with a history of hypertension and asthma who is being followed for a right-sided renal mass since 2008.  On her surveillance MRI they noted dilated pancreatic duct.  The pancreatic duct was shown to have minimal irregular dilatation with multiple foci branch ectasia.  Since she had a pound weight loss we ended up performing an endoscopic ultrasound.  During the endoscopic ultrasound a periampullary diverticulum was identified right adjacent to the major papilla and the pancreatic duct was prominent at 4 mm and was thought to be secondary to the periampullary diverticulum.  There was some suggestion of a stricture in the neck region with a visible side branches.  Overall, she had 3 out of 9 standard endosonographic features which was indeterminate for chronic noncalcific pancreatitis.  She is here for followup post-procedure.         She has been doing really well.  She denies nausea, vomiting, abdominal pain.  Her main symptom is constipation and she tries alternative medicine for the same and she has 1 bowel movement every other day and 1, occasionally 1 bowel movement every day.  She denies melena, hematochezia, hematemesis.  She has had a colonoscopy in 2011 and is due for a screening colonoscopy in 2021.  Of note, her weight has remained stable, and her appetite remains stable.  She has an extensive history of cancer in her family, including ovarian in the mother and grandmother and colon cancer in 2 paternal uncles and she had been tested for all genetic mutations and was noted to be negative for BRCA 1 and BRCA 2.   She denies dysphagia or odynophagia.      PAST MEDICAL HISTORY:   1.  Mild persistent asthma.   2.  Melanoma.   3.  Hypertension.   4.  Osteoarthritis.   5.  Ureteral stricture and  stenosis of the esophagus.   6.  Obese.        PAST SURGICAL HISTORY:   1.  Hysterectomy.   2.  EGD.      FAMILY HISTORY:  A family history of colon cancer in 2 paternal uncles.  A family history of ovarian cancer in mother and grandmother.  History of heart disease, hypertension, hyperlipidemia, coronary artery disease in father.      ALLERGIES:  Amoxicillin, sulfa drugs, Ultram.      REVIEW OF SYSTEMS:  A complete 10-point review of system was performed and noted to be negative except as above.      PHYSICAL EXAMINATION:   VITAL SIGNS:  Blood pressure of 141/90, temperature of 98.6, heart rate of 80 per minute, weight of 57.3 kg.   GENERAL:  She is not in acute distress.   HEAD AND NECK:  No pallor, no icterus.   CHEST:  Lungs are clear to auscultation.   CARDIOVASCULAR:  S1, S2 heard, rate and rhythm are regular.   ABDOMEN:  Soft, nontender, nondistended.  Bowel sounds are heard.   NEUROLOGIC:  Alert, awake, oriented to time, place and person.   EXTREMITIES:  No pedal edema.        MRCP, MRI, which was done in 07/2018 showed minimal irregular pancreatic ductal dilation along with multifocal side branch ectasia.  Findings are most prominent in the pancreatic tail region and previous noted cyst in the pancreatic body was no longer visualized.      ASSESSMENT AND PLAN:  This is a 63-year-old white female with past medical history significant for hypertension, mild persistent asthma who underwent an MRI for surveillance of a right-sided renal mass and was noted to have a dilated pancreatic duct and she had a 10-pound intentional weight loss.  EUS showed a periampullary diverticulum and a dilated pancreatic duct was attributed to a periampullary diverticulum.  She is here for a post-procedure followup and is completely asymptomatic for the past 5 months.        RECOMMENDATIONS:    1.  We reassured the patient that her weight has been stable and her pancreatic duct which was prominently dilated (4 mm in the region of  the head) was most likely due to a diverticulum.  She had visible side branches and a possible stricture in the pancreatic neck.  Altogether, she had 3 out of 01/09 standard changes of which were indeterminate for chronic pancreatitis, and given how asymptomatic she is, we will not recommend pancreatic enzymes at this point.     2.  The patient is due for a colonoscopy in 2021.  Would recommend patient for surveillance colonoscopy in 2021.   3.  Constipation.  We encouraged Senna and Colace which the patient has been  on an outpatient basis.   4.  No further followup is needed.      A total of 40 minutes were spent with more than 50% of the time in counseling.

## 2018-12-06 NOTE — NURSING NOTE
"Chief Complaint   Patient presents with     Consult     RETURN -        Vitals:    12/06/18 1015   BP: 141/90   Pulse: 80   Temp: 98.6  F (37  C)   TempSrc: Oral   SpO2: 97%   Weight: 126 lb 6.4 oz   Height: 5' 4\"       Body mass index is 21.7 kg/(m^2).      Stuart Arias on 12/6/2018 at 10:20 AM                          "

## 2019-01-02 DIAGNOSIS — J45.30 MILD PERSISTENT ASTHMA: ICD-10-CM

## 2019-01-02 RX ORDER — ALBUTEROL SULFATE 90 UG/1
2 AEROSOL, METERED RESPIRATORY (INHALATION) EVERY 6 HOURS PRN
Qty: 18 G | Refills: 0 | Status: SHIPPED | OUTPATIENT
Start: 2019-01-02 | End: 2019-07-19

## 2019-05-22 ENCOUNTER — TELEPHONE (OUTPATIENT)
Dept: INTERNAL MEDICINE | Facility: CLINIC | Age: 64
End: 2019-05-22

## 2019-05-22 DIAGNOSIS — Z13.6 CARDIOVASCULAR SCREENING; LDL GOAL LESS THAN 160: ICD-10-CM

## 2019-05-22 DIAGNOSIS — I10 ESSENTIAL HYPERTENSION: Primary | Chronic | ICD-10-CM

## 2019-05-22 NOTE — TELEPHONE ENCOUNTER
[facility] Call Center    Phone Message    May a detailed message be left on voicemail: yes    Reason for Call: Order(s): Other:   Reason for requested: Patient is requesting orders for her annual physical be placed so she can do them ahead of time. Please call her  Date needed: before 07/18/19  Provider name: Blaire Hayes      Action Taken: Message routed to:  [facility] ([facility] PASHA

## 2019-05-23 NOTE — TELEPHONE ENCOUNTER
Route to MD with pending orders. Saray Renee on 5/23/2019 at 9:23 AM  Called pt to advise the patient will order labs at the appointment. Saray Renee on 5/24/2019 at 11:53 AM

## 2019-07-18 ENCOUNTER — OFFICE VISIT (OUTPATIENT)
Dept: INTERNAL MEDICINE | Facility: CLINIC | Age: 64
End: 2019-07-18
Payer: COMMERCIAL

## 2019-07-18 VITALS
DIASTOLIC BLOOD PRESSURE: 85 MMHG | BODY MASS INDEX: 21.39 KG/M2 | SYSTOLIC BLOOD PRESSURE: 144 MMHG | HEIGHT: 64 IN | HEART RATE: 75 BPM | WEIGHT: 125.3 LBS | OXYGEN SATURATION: 98 %

## 2019-07-18 DIAGNOSIS — Z13.220 SCREENING FOR HYPERLIPIDEMIA: ICD-10-CM

## 2019-07-18 DIAGNOSIS — Z00.00 ROUTINE HISTORY AND PHYSICAL EXAMINATION OF ADULT: Primary | ICD-10-CM

## 2019-07-18 DIAGNOSIS — N28.1 RENAL CYST: ICD-10-CM

## 2019-07-18 DIAGNOSIS — I10 ESSENTIAL HYPERTENSION: ICD-10-CM

## 2019-07-18 DIAGNOSIS — Z00.00 ROUTINE HISTORY AND PHYSICAL EXAMINATION OF ADULT: ICD-10-CM

## 2019-07-18 DIAGNOSIS — Z12.31 ENCOUNTER FOR SCREENING MAMMOGRAM FOR BREAST CANCER: ICD-10-CM

## 2019-07-18 LAB
ANION GAP SERPL CALCULATED.3IONS-SCNC: 4 MMOL/L (ref 3–14)
BUN SERPL-MCNC: 13 MG/DL (ref 7–30)
CALCIUM SERPL-MCNC: 8.8 MG/DL (ref 8.5–10.1)
CHLORIDE SERPL-SCNC: 105 MMOL/L (ref 94–109)
CHOLEST SERPL-MCNC: 165 MG/DL
CO2 SERPL-SCNC: 29 MMOL/L (ref 20–32)
CREAT SERPL-MCNC: 0.71 MG/DL (ref 0.52–1.04)
CREAT UR-MCNC: 29 MG/DL
GFR SERPL CREATININE-BSD FRML MDRD: >90 ML/MIN/{1.73_M2}
GLUCOSE SERPL-MCNC: 97 MG/DL (ref 70–99)
HDLC SERPL-MCNC: 89 MG/DL
LDLC SERPL CALC-MCNC: 71 MG/DL
MICROALBUMIN UR-MCNC: <5 MG/L
MICROALBUMIN/CREAT UR: NORMAL MG/G CR (ref 0–25)
NONHDLC SERPL-MCNC: 76 MG/DL
POTASSIUM SERPL-SCNC: 3.9 MMOL/L (ref 3.4–5.3)
SODIUM SERPL-SCNC: 138 MMOL/L (ref 133–144)
TRIGL SERPL-MCNC: 27 MG/DL

## 2019-07-18 RX ORDER — LISINOPRIL 5 MG/1
5 TABLET ORAL DAILY
Qty: 90 TABLET | Refills: 3 | Status: SHIPPED | OUTPATIENT
Start: 2019-07-18 | End: 2020-05-18

## 2019-07-18 ASSESSMENT — ENCOUNTER SYMPTOMS
NECK PAIN: 0
STIFFNESS: 0
POOR WOUND HEALING: 1
ARTHRALGIAS: 1
NAIL CHANGES: 0
MYALGIAS: 0
SKIN CHANGES: 1
JOINT SWELLING: 0
BACK PAIN: 0
MUSCLE WEAKNESS: 0
MUSCLE CRAMPS: 0

## 2019-07-18 ASSESSMENT — MIFFLIN-ST. JEOR: SCORE: 1113.36

## 2019-07-18 ASSESSMENT — PAIN SCALES - GENERAL: PAINLEVEL: NO PAIN (0)

## 2019-07-18 NOTE — PATIENT INSTRUCTIONS
[facility] Phone Number [phone]  [facility] Medication Refill Request Information:  * Please contact your pharmacy regarding ANY request for medication refills.  ** [facility] Prescription Fax = [phone]  * Please allow 3 business days for routine medication refills.  * Please allow 5 business days for controlled substance medication refills.     [facility] Test Result notification information:  *You will be notified with in 7-10 days of your appointment day regarding the results of your test.  If you are on MyChart you will be notified as soon as the provider has reviewed the results and signed off on them.          Clinical Trials  [email or web address]

## 2019-07-18 NOTE — NURSING NOTE
"63 year old  Chief Complaint   Patient presents with     Physical     Pt is here for a physical.        Blood pressure 144/85, pulse 75, height 1.626 m (5' 4\"), weight 56.8 kg (125 lb 4.8 oz), last menstrual period 02/12/2006, SpO2 98 %, not currently breastfeeding. Body mass index is 21.51 kg/m .  BP completed using cuff size:      Marisa Cole MA  July 18, 2019 9:43 AM  "

## 2019-07-18 NOTE — PROGRESS NOTES
[facility]  Primary Care Center   Blaire Hayes MD  07/18/2019      Chief Complaint:   Physical     History of Present Illness:   Delilah Lopes is a 63 year old female with a history of hypertension, melanoma, and asthma who presents for physical.    Asthma: She uses her inhaler PRN, and it is mostly seasonal. She has some triggers at her work that she tries to avoid.     Hypertension: Her blood pressure today is 144/85, and she describes that she cut her lisinopril in half from 5 mg for the past three months.     Melanoma: She continues to follow with dermatology for routine checks. Of note, she has a spot of seborrheic keratosis with a darker dot in the middle, and would like to have this examined.     Routine health maintenance: Her last mammogram was done January 2018. She states that she has had a mammogram every year since she was 20 years old, and is wondering if there is another option as she would not like to be exposed to more radiation. She no longer has pap smears as she had a hysterectomy. She does have a vaginal sore that will come and go since her hysterectomy. She tries to keep the area dry to help.  Her next colonoscopy is not until 2021.     Renal mass: She describes that her last CT scan of her kidney was in 2017. She states the [facility] told her to just monitor the mass for now. However, she is wondering how often she needs to be following up on this.     Anxiety: She typically goes to acupuncture once a week for for pain in her shoulders and alleviating her anxiety. This helps well.      Review of Systems:   Pertinent items are noted in HPI or as in patient entered ROS below, remainder of complete ROS is negative.   Answers for HPI/ROS submitted by the patient on 7/18/2019   General Symptoms: No  Skin Symptoms: Yes  HENT Symptoms: No  EYE SYMPTOMS: No  HEART SYMPTOMS: No  LUNG SYMPTOMS: No  INTESTINAL SYMPTOMS: No  URINARY SYMPTOMS: No  GYNECOLOGIC SYMPTOMS: No  BREAST  SYMPTOMS: No  SKELETAL SYMPTOMS: Yes  BLOOD SYMPTOMS: No  NERVOUS SYSTEM SYMPTOMS: No  MENTAL HEALTH SYMPTOMS: No  Changes in hair: No  Changes in moles/birth marks: Yes  Itching: No  Rashes: No  Changes in nails: No  Acne: No  Hair in places you don't want it: No  Change in facial hair: No  Warts: No  Non-healing sores: Yes  Scarring: No  Flaking of skin: No  Color changes of hands/feet in cold : No  Sun sensitivity: No  Skin thickening: No  Back pain: No  Muscle aches: No  Neck pain: No  Swollen joints: No  Joint pain: Yes  Bone pain: No  Muscle cramps: No  Muscle weakness: No  Joint stiffness: No  Bone fracture: No    Active Medications:       albuterol (VENTOLIN HFA) 108 (90 Base) MCG/ACT inhaler, Inhale 2 puffs into the lungs every 6 hours as needed for shortness of breath / dyspnea Due for Clinic follow up 1-2019, Disp: 18 g, Rfl: 0     Ascorbic Acid (VITAMIN C PO), Take 1,500 mg by mouth daily, Disp: , Rfl:      Ascorbic Acid (VITAMIN C) 100 MG TABS, Take 1,500 mg by mouth At Bedtime , Disp: , Rfl:      B COMPLEX OR, 1 tablet orally at bedtime, Disp: , Rfl:      DIGESTIVE ENZYMES PO, , Disp: , Rfl:      lisinopril (PRINIVIL/ZESTRIL) 5 MG tablet, TAKE 1 TABLET BY MOUTH EVERY DAY, Disp: 90 tablet, Rfl: 3     MULTI-VITAMIN OR TABS, 1 TABLET DAILY at bedtime, Disp: , Rfl:      Omega-3 Fatty Acids (FISH OIL PO), Take 2 capsules by mouth At Bedtime, Disp: , Rfl:      VITAMIN D, CHOLECALCIFEROL, PO, Take 2,500 Units by mouth At Bedtime, Disp: , Rfl:       Allergies:   Nicotiana tabacum; Amoxicillin; Cat hair extract; Cat hair [cats]; Sulfa drugs; and Ultram [tramadol hcl]      Past Medical History:  Hypertension  Melanoma  Asthma  Osteoarthritis  Stricture and stenosis of esophagus  Urethral stricture   Cold sore      Past Surgical History:  Esophagoscopy, gastroscopy, duodenoscopy, combined  Hernia repair  Hysterectomy   Stent in kidney  Surgical pathology exam     Family History:   Ovarian cancer - mother  Heart  "disease - father  Hypertension - father, brother  Lipids - father  CAD - father, paternal grandmother  Breast cancer - maternal grandmother  Cervical cancer - paternal aunt  Uterine cancer - paternal aunt  Diabetes - paternal aunt  Testicular cancer - son      Social History:   This patient presented alone.   Smoking status: never  Smokeless tobacco: never  Alcohol use: yes, 5 beers per week  Drug use: no     Physical Exam:   /85   Pulse 75   Ht 1.626 m (5' 4\")   Wt 56.8 kg (125 lb 4.8 oz)   LMP 02/12/2006   SpO2 98%   BMI 21.51 kg/m     General: Pleasant female, in no apparent distress  ENT: TMs normal bilaterally, oropharynx clear, mucous membranes are moist.   Neck:  No lymphadenopathy, no thyromegaly, no carotid bruits  Resp: lungs clear to ausculation bilaterally  CV: Heart regular rate and rhythm, no murmur, rub, or gallop  Abd: Soft, nontender, nondistended, normal bowel sounds, no HSM  Ext: Warm and well perfused, no LE edema  Skin: warm, dry, no rash  Neuro: Alert and oriented x 3, no focal deficits       Assessment and Plan:  Routine history and physical examination of adult  - Basic metabolic panel  - Albumin Random Urine Quantitative with Creat Ratio  - Lipid panel reflex to direct LDL Fasting    Encounter for screening mammogram for breast cancer  She was amenable to do another mammogram, however does have some concern for radiation exposure. She is interested in participating in studies for breast cancer screening that do not involve radiation, and I provided her with a web site to find these studies.   - MA Screening Digital Bilateral    Screening for hyperlipidemia  - Lipid panel reflex to direct LDL Fasting    Essential hypertension  I recommended taking the 5 mg dose given that her blood pressure is still elevated.   - Basic metabolic panel  - Albumin Random Urine Quantitative with Creat Ratio  - lisinopril (PRINIVIL/ZESTRIL) 5 MG tablet  Dispense: 90 tablet; Refill: 3  -Return for " nurse visit in ~4 weeks for BP check. If still elevated at that time, increase lisinopril to 10 mg daily.     Renal cyst  Will do an US to avoid using radiation for time being. However did discuss that if it has grown in size, then we might have to do further imaging.   - US Renal Complete       Scribe Disclosure:  I, Alicja Beavers, am serving as a scribe to document services personally performed by Blaire Hayes MD at this visit, based upon the provider's statements to me. All documentation has been reviewed by the aforementioned provider prior to being entered into the official medical record.     Portions of this medical record were completed by a scribe. UPON MY REVIEW AND AUTHENTICATION BY ELECTRONIC SIGNATURE, this confirms (a) I performed the applicable clinical services, and (b) the record is accurate.     Blaire Hayes MD

## 2019-07-19 ENCOUNTER — MYC MEDICAL ADVICE (OUTPATIENT)
Dept: INTERNAL MEDICINE | Facility: CLINIC | Age: 64
End: 2019-07-19

## 2019-07-19 ENCOUNTER — MYC REFILL (OUTPATIENT)
Dept: INTERNAL MEDICINE | Facility: CLINIC | Age: 64
End: 2019-07-19

## 2019-07-19 DIAGNOSIS — J45.30 MILD PERSISTENT ASTHMA: ICD-10-CM

## 2019-07-19 RX ORDER — ALBUTEROL SULFATE 90 UG/1
2 AEROSOL, METERED RESPIRATORY (INHALATION) EVERY 6 HOURS PRN
Qty: 18 G | Refills: 0 | Status: CANCELLED | OUTPATIENT
Start: 2019-07-19

## 2019-07-19 RX ORDER — ALBUTEROL SULFATE 90 UG/1
2 AEROSOL, METERED RESPIRATORY (INHALATION) EVERY 6 HOURS PRN
Qty: 18 G | Refills: 0 | Status: SHIPPED | OUTPATIENT
Start: 2019-07-19 | End: 2020-01-16

## 2019-07-25 ENCOUNTER — PRE VISIT (OUTPATIENT)
Dept: UROLOGY | Facility: CLINIC | Age: 64
End: 2019-07-25

## 2019-07-25 ENCOUNTER — ANCILLARY PROCEDURE (OUTPATIENT)
Dept: MAMMOGRAPHY | Facility: CLINIC | Age: 64
End: 2019-07-25
Attending: INTERNAL MEDICINE
Payer: COMMERCIAL

## 2019-07-25 ENCOUNTER — ANCILLARY PROCEDURE (OUTPATIENT)
Dept: ULTRASOUND IMAGING | Facility: CLINIC | Age: 64
End: 2019-07-25
Attending: INTERNAL MEDICINE
Payer: COMMERCIAL

## 2019-07-25 DIAGNOSIS — Z12.31 ENCOUNTER FOR SCREENING MAMMOGRAM FOR BREAST CANCER: ICD-10-CM

## 2019-07-25 DIAGNOSIS — N28.1 RENAL CYST: ICD-10-CM

## 2019-07-25 DIAGNOSIS — Z12.31 VISIT FOR SCREENING MAMMOGRAM: ICD-10-CM

## 2019-07-25 NOTE — TELEPHONE ENCOUNTER
Renal cyst follow up.  Renal u/s done on 7-25-19 (Dr. Sheikh).  Records available in [facility].

## 2019-10-03 ENCOUNTER — HEALTH MAINTENANCE LETTER (OUTPATIENT)
Age: 64
End: 2019-10-03

## 2020-01-16 DIAGNOSIS — J45.30 MILD PERSISTENT ASTHMA: ICD-10-CM

## 2020-01-16 NOTE — TELEPHONE ENCOUNTER
albuterol (VENTOLIN HFA) 108 (90 Base) MCG/ACT inhaler      Last Written Prescription Date:  19  Last Fill Quantity: 18 g,   # refills: 0  Last Office Visit : 19  Future Office visit:  0    Routing refill request to provider for review/approval because:  Dr. Aguilar Hayes has retired..  ACT score needed    Pt informed that she need to follow up/est  with a different provider.Clinic numbers given for questions or concerns.  Barbara Garcia RN 8:07 AM on 2020.

## 2020-01-20 RX ORDER — ALBUTEROL SULFATE 90 UG/1
2 AEROSOL, METERED RESPIRATORY (INHALATION) EVERY 6 HOURS PRN
Qty: 18 INHALER | Refills: 1 | Status: SHIPPED | OUTPATIENT
Start: 2020-01-20

## 2020-05-13 DIAGNOSIS — J45.30 MILD PERSISTENT ASTHMA: ICD-10-CM

## 2020-05-14 ENCOUNTER — TELEPHONE (OUTPATIENT)
Dept: INTERNAL MEDICINE | Facility: CLINIC | Age: 65
End: 2020-05-14

## 2020-05-14 RX ORDER — ALBUTEROL SULFATE 90 UG/1
2 AEROSOL, METERED RESPIRATORY (INHALATION) EVERY 6 HOURS PRN
Qty: 8.5 INHALER | Refills: 1 | OUTPATIENT
Start: 2020-05-14

## 2020-05-14 NOTE — TELEPHONE ENCOUNTER
" ALBUTEROL HFA (PROAIR) INHALER   Last Written Prescription Date:  1/20/20  Last Fill Quantity: 18,   # refills: 1   Last Office Visit : 7/18/19  Future Office visit:  0     Routing refill request to provider for review/approval because:  Dr. Aguilar Hayes has retired, patient has not reestablished care at [facility]..  ACT score needed     \"Pt informed that she need to follow up/est care with a different provider.Clinic numbers given for questions or concerns.  Barbara Garcia RN 8:07 AM on 1/20/2020.\"    Scheduling has been notified to contact the pt for appointment.      "

## 2020-05-14 NOTE — TELEPHONE ENCOUNTER
----- Message from Cherie Ye RN sent at 5/14/2020  8:40 AM CDT -----  Regarding: appt due, former Aguilar Hayes  Please call to schedule/determine if still wishes to follow at [facility]    Thanks    I do not need any follow up on the scheduling of this appointment unless the patient will no longer be receiving care in our clinic.

## 2020-05-18 ENCOUNTER — VIRTUAL VISIT (OUTPATIENT)
Dept: INTERNAL MEDICINE | Facility: CLINIC | Age: 65
End: 2020-05-18
Payer: COMMERCIAL

## 2020-05-18 DIAGNOSIS — Z23 NEED FOR VACCINATION: ICD-10-CM

## 2020-05-18 DIAGNOSIS — J45.20 MILD INTERMITTENT ASTHMA WITHOUT COMPLICATION: ICD-10-CM

## 2020-05-18 DIAGNOSIS — I10 ESSENTIAL HYPERTENSION: ICD-10-CM

## 2020-05-18 DIAGNOSIS — Z12.31 ENCOUNTER FOR SCREENING MAMMOGRAM FOR MALIGNANT NEOPLASM OF BREAST: ICD-10-CM

## 2020-05-18 DIAGNOSIS — Z85.820 HISTORY OF MELANOMA: ICD-10-CM

## 2020-05-18 DIAGNOSIS — Z76.89 ENCOUNTER TO ESTABLISH CARE WITH NEW DOCTOR: Primary | ICD-10-CM

## 2020-05-18 DIAGNOSIS — J45.30 MILD PERSISTENT ASTHMA WITHOUT COMPLICATION: ICD-10-CM

## 2020-05-18 RX ORDER — ALBUTEROL SULFATE 90 UG/1
2 AEROSOL, METERED RESPIRATORY (INHALATION) EVERY 4 HOURS PRN
Qty: 18 G | Refills: 2 | Status: SHIPPED | OUTPATIENT
Start: 2020-05-18 | End: 2020-12-10

## 2020-05-18 RX ORDER — LISINOPRIL 5 MG/1
5 TABLET ORAL DAILY
Qty: 90 TABLET | Refills: 3 | Status: SHIPPED | OUTPATIENT
Start: 2020-05-18 | End: 2021-06-22

## 2020-05-18 NOTE — PROGRESS NOTES
Virtual audio visit    CC:  Establish care    Other health care team members:   dermatologist in [facility], Dr. Tenisha Wilson    HPI:  64 year old female, former Dr. Sheikh patient      PMHx reviewed with patient, and is notable for  Hypertension  Melanoma skin over right hip area 2017, has dermatologist in [facility], Dr. 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 Cats and smoke triggers her asthma, these things she encounters frequently while working.  Uses albuterol rescue about once a week currently, but it sounds like she may be unde rtreating her symptoms.  Describes difficulty breathing out, hasn't been able to run. Does not have controller inhaler, previously on Symbicort (headaches), then Asthmanex (didn't notice much difference).  Last PFT's at Center years ago, and she recalls them being abnormal.   I also answered questions she had about her ampullary diverticulum, renal cyst and whether there are any natural remedies she could try regarding asthma.  Overall she feels well, and states has no other complaints.        Patient Active Problem List   Diagnosis     Stricture and stenosis of esophagus     Urethral stricture     Mild persistent asthma     Cold sore     CARDIOVASCULAR SCREENING; LDL GOAL LESS THAN 160     Essential hypertension     Past Medical History:   Diagnosis Date     Essential hypertension 11/30/2015     Melanoma (H) 12/2016     Mild persistent asthma      Osteoarthritis      Stricture and stenosis of esophagus     Dx 12/99, [facility]     Urethral stricture unspecified      Past Surgical History:    Procedure Laterality Date     C NONSPECIFIC PROCEDURE       x3 ,,     ESOPHAGOSCOPY, GASTROSCOPY, DUODENOSCOPY (EGD), COMBINED N/A 2018    Procedure: COMBINED ENDOSCOPIC ULTRASOUND, ESOPHAGOSCOPY, GASTROSCOPY, DUODENOSCOPY (EGD);  EUS;  Surgeon: Guru Delgado Henry MD;  Location: [facility]     HERNIA REPAIR      2 hernia repairs     HYSTERECTOMY, PAP NO LONGER INDICATED       STENT  2017    kidney     SURGICAL PATHOLOGY EXAM  2017     Family History   Problem Relation Age of Onset     Cancer Mother         ovarian cancer     Heart Disease Father         heart attacks with bypass     Hypertension Father      Lipids Father      C.A.D. Father      Cancer Maternal Grandmother         breast cancer     C.A.D. Paternal Grandfather      Cancer Paternal Aunt         cervicle cancer     Cancer Paternal Aunt         uterin cancer     Diabetes Paternal Aunt      Hypertension Brother      Cancer Son         testicular     Social History     Tobacco Use     Smoking status: Never Smoker     Smokeless tobacco: Never Used   Substance Use Topics     Alcohol use: Yes     Alcohol/week: 5.0 standard drinks     Types: 5 Cans of beer per week     Comment: occassional     Drug use: No     Current Outpatient Medications   Medication Sig Dispense Refill     albuterol (VENTOLIN HFA) 108 (90 Base) MCG/ACT inhaler Inhale 2 puffs into the lungs every 6 hours as needed for shortness of breath / dyspnea or wheezing 18 Inhaler 1     Ascorbic Acid (VITAMIN C PO) Take 1,500 mg by mouth daily       Ascorbic Acid (VITAMIN C) 100 MG TABS Take 1,500 mg by mouth At Bedtime        B COMPLEX OR 1 tablet orally at bedtime       DIGESTIVE ENZYMES PO        lisinopril (PRINIVIL/ZESTRIL) 5 MG tablet Take 1 tablet (5 mg) by mouth daily 90 tablet 3     MULTI-VITAMIN OR TABS 1 TABLET DAILY at bedtime       Omega-3 Fatty Acids (FISH OIL PO) Take 2 capsules by mouth At Bedtime       VITAMIN D, CHOLECALCIFEROL, PO Take  2,500 Units by mouth At Bedtime       Allergies   Allergen Reactions     Nicotiana Tabacum      Other reaction(s): Breathing Difficulty     Amoxicillin      rash     Cat Hair Extract      Cat Hair [Cats]      Sulfa Drugs Rash     Ultram [Tramadol Hcl]      Headache  Weirdness  And sick nasuea, vomiting     BP Readings from Last 6 Encounters:   07/18/19 144/85   12/06/18 141/90   08/14/18 (!) 167/99   08/13/18 134/76   07/19/18 124/82   01/19/18 141/90     Wt Readings from Last 5 Encounters:   07/18/19 56.8 kg (125 lb 4.8 oz)   12/06/18 57.3 kg (126 lb 6.4 oz)   08/14/18 57.9 kg (127 lb 11.2 oz)   08/13/18 57.6 kg (127 lb)   07/19/18 58 kg (127 lb 14.4 oz)     [facility]    Component      Latest Ref Rng & Units 7/18/2019   Sodium      133 - 144 mmol/L 138   Potassium      3.4 - 5.3 mmol/L 3.9   Chloride      94 - 109 mmol/L 105   Carbon Dioxide      20 - 32 mmol/L 29   Anion Gap      3 - 14 mmol/L 4   Glucose      70 - 99 mg/dL 97   Urea Nitrogen      7 - 30 mg/dL 13   Creatinine      0.52 - 1.04 mg/dL 0.71     Component      Latest Ref Rng & Units 7/18/2019   Cholesterol      <200 mg/dL 165   Triglycerides      <150 mg/dL 27   HDL Cholesterol      >49 mg/dL 89   LDL Cholesterol Calculated      <100 mg/dL 71     Component      Latest Ref Rng & Units 11/30/2017   Hepatitis C Antibody      NR:Nonreactive Nonreactive     Mammogram 7/25/19  Impression: BI-RADS CATEGORY: 1 -  Negative.    Renal US 7/25/19  IMPRESSION: No hydroureteronephrosis or nephrolithiasis. Simple right  renal cyst as above; no specific dedicated follow-up is required in  this regard.    Delilah was seen today for establish care.    Diagnoses and all orders for this visit:    Encounter to establish care with new doctor    Essential hypertension  -     Refill  lisinopril (ZESTRIL) 5 MG tablet; Take 1 tablet (5 mg) by mouth daily  -     Basic metabolic panel; Future  -     Lipid panel reflex to direct LDL  "Fasting; Future    History of melanoma    Encounter for screening mammogram for malignant neoplasm of breast  -     Mammogram, screening w nate (3D); Future    Need for vaccination    Mild persistent asthma without complication  -     General PFT Lab (Please always keep checked); Future  -     Pulmonary Function Test; Future  -    start fluticasone-vilanterol (BREO ELLIPTA) 100-25 MCG/INH inhaler; Inhale 1 puff into the lungs daily  -     Refill albuterol (PROAIR HFA/PROVENTIL HFA/VENTOLIN HFA) 108 (90 Base) MCG/ACT inhaler; Inhale 2 puffs into the lungs every 4 hours as needed for shortness of breath / dyspnea or wheezing      Follow up in about 6 months for physical/meet in person.      This patient is being evaluated via a billable telephone visit; THIS VISIT WAS INITIATED BY THE PT, as AN ALTERNATIVE TO IN PERSON VISIT .       The patient has has been notified of following:      \"This billable telephone visit will be conducted via a call between you and your physician/provider. We have found that certain health care needs can be provided without the need for a physical exam.  This service lets us provide the care you need with a short phone conversation.  If a prescription is necessary we can send it directly to your pharmacy.  If lab work is needed we can place an order for that and you can then stop by our lab to have the test done at a later time. We can also place orders for a limited number of imaging tests if they are deemed urgently necessary.     If during the course of the call the physician/provider feels a telephone visit is not appropriate, you will not be charged for this service.\"     Due to efforts to reduce the spread of COVID-19 in the clinic, state, nation, telephone visits are encouraged currently. Patient understands that diagnose and advice is limited by the inability to exam him/her/them face-to-face.    Patient has given verbal consent for the virtual audio visit? Yes  Did patient initiate " this virtual visit? Yes    Person spoken to: Delilah Lopes    This was a synchronous virtual visit  Time call initiated:  1:32  Time call ended:  2:00  Total length of call: 28 min    Maria Elena Rice M.D.  Internal Medicine  Primary Care Center   pager [phone]

## 2020-05-18 NOTE — NURSING NOTE
Chief Complaint   Patient presents with     Establish Care     Pt would like to establish care over the phone       ELPIDIO Siu at 12:49 PM sign on 5/18/2020

## 2020-11-05 ENCOUNTER — ANCILLARY PROCEDURE (OUTPATIENT)
Dept: MAMMOGRAPHY | Facility: CLINIC | Age: 65
End: 2020-11-05
Attending: INTERNAL MEDICINE
Payer: COMMERCIAL

## 2020-11-05 DIAGNOSIS — Z12.31 ENCOUNTER FOR SCREENING MAMMOGRAM FOR MALIGNANT NEOPLASM OF BREAST: ICD-10-CM

## 2020-11-05 PROCEDURE — 77067 SCR MAMMO BI INCL CAD: CPT | Performed by: RADIOLOGY

## 2020-11-05 PROCEDURE — 77063 BREAST TOMOSYNTHESIS BI: CPT | Performed by: RADIOLOGY

## 2020-11-07 ENCOUNTER — HEALTH MAINTENANCE LETTER (OUTPATIENT)
Age: 65
End: 2020-11-07

## 2020-12-03 ENCOUNTER — TELEPHONE (OUTPATIENT)
Dept: INTERNAL MEDICINE | Facility: CLINIC | Age: 65
End: 2020-12-03

## 2020-12-03 NOTE — TELEPHONE ENCOUNTER
[facility] Call Center    Phone Message    May a detailed message be left on voicemail: yes     Reason for Call: Other: Pt called looking to speak to someone about getting an order for an Ultrasound or MRI to look at her kidneys. Pt stated it was discussed with Dr. Rice but there were no orders in the pts chart for imaging. Please follow up with pt     Action Taken: Message routed to:  [facility]    Travel Screening: Not Applicable

## 2020-12-10 ENCOUNTER — TELEPHONE (OUTPATIENT)
Dept: INTERNAL MEDICINE | Facility: CLINIC | Age: 65
End: 2020-12-10

## 2020-12-10 DIAGNOSIS — Z00.00 HEALTHCARE MAINTENANCE: Primary | ICD-10-CM

## 2020-12-10 DIAGNOSIS — J45.20 MILD INTERMITTENT ASTHMA WITHOUT COMPLICATION: ICD-10-CM

## 2020-12-10 RX ORDER — ALBUTEROL SULFATE 90 UG/1
2 AEROSOL, METERED RESPIRATORY (INHALATION) EVERY 4 HOURS PRN
Qty: 18 G | Refills: 2 | Status: SHIPPED | OUTPATIENT
Start: 2020-12-10

## 2020-12-10 NOTE — TELEPHONE ENCOUNTER
[facility] Call Center    Phone Message    May a detailed message be left on voicemail: yes     Reason for Call: Other: Patient Request: Patient is requesting lab orders to be placed. Patient was scheduled for a physical appointment with Carrie Carrion on 12/11/2020. Patient states she has not had a physical in a while and for insurance purposes she would like for Dr. Rice to order labs that are age appropriate for her to do before the end of the year. Patient did reschedule with Carrie Carrion to 02/03/2021. Please call patient to confirm labs have been placed.     Action Taken: Message routed to:  [facility]    Travel Screening: Not Applicable

## 2020-12-10 NOTE — TELEPHONE ENCOUNTER
Health Call Center    Phone Message    May a detailed message be left on voicemail: yes     Reason for Call: Medication Refill Request    Has the patient contacted the pharmacy for the refill? Yes   Name of medication being requested: fluticasone-vilanterol (BREO ELLIPTA) 100-25 MCG/INH inhaler and albuterol (PROAIR HFA/PROVENTIL HFA/VENTOLIN HFA) 108 (90 Base) MCG/ACT inhaler  Provider who prescribed the medication: Maria Elena Rice  Pharmacy: [facility]Y #[id number] [place], [place] - [address] [facility]  Date medication is needed: 12/11/2020, Patient would like a 3 month supply for the medications listed. Patient states she will have new insurance stating January and would like this taken care of before then. Please call patient back to confirm.    Action Taken: Message routed to:  [facility]    Travel Screening: Not Applicable

## 2020-12-11 NOTE — TELEPHONE ENCOUNTER
Lab orders signed; she can come in for fasting labs in the next 2 weeks. Please have her schedule a lab appointment. Thanks!   Carrie Carrion, MAGDALENA CNP    Pt called and informed about scheduling a lab appt. Clinic numbers given.  Barbara Garcia RN 10:15 AM on 12/11/2020.

## 2020-12-23 DIAGNOSIS — Z00.00 HEALTHCARE MAINTENANCE: ICD-10-CM

## 2020-12-23 PROCEDURE — 80048 BASIC METABOLIC PNL TOTAL CA: CPT | Performed by: NURSE PRACTITIONER

## 2020-12-23 PROCEDURE — 36415 COLL VENOUS BLD VENIPUNCTURE: CPT | Performed by: NURSE PRACTITIONER

## 2020-12-23 PROCEDURE — 80061 LIPID PANEL: CPT | Performed by: NURSE PRACTITIONER

## 2020-12-24 LAB
ANION GAP SERPL CALCULATED.3IONS-SCNC: 4 MMOL/L (ref 3–14)
BUN SERPL-MCNC: 12 MG/DL (ref 7–30)
CALCIUM SERPL-MCNC: 9 MG/DL (ref 8.5–10.1)
CHLORIDE SERPL-SCNC: 104 MMOL/L (ref 94–109)
CHOLEST SERPL-MCNC: 181 MG/DL
CO2 SERPL-SCNC: 29 MMOL/L (ref 20–32)
CREAT SERPL-MCNC: 0.66 MG/DL (ref 0.52–1.04)
GFR SERPL CREATININE-BSD FRML MDRD: >90 ML/MIN/{1.73_M2}
GLUCOSE SERPL-MCNC: 84 MG/DL (ref 70–99)
HDLC SERPL-MCNC: 85 MG/DL
LDLC SERPL CALC-MCNC: 90 MG/DL
NONHDLC SERPL-MCNC: 96 MG/DL
POTASSIUM SERPL-SCNC: 4 MMOL/L (ref 3.4–5.3)
SODIUM SERPL-SCNC: 137 MMOL/L (ref 133–144)
TRIGL SERPL-MCNC: 31 MG/DL

## 2021-01-01 NOTE — PROGRESS NOTES
Spoke with patient. Hasn't been checking BP at home, but will start. Her BP in the clinic last week was 141/90, said she was a little anxious at the time. Feels great without symptoms at this time. Will check in with her again in another couple of weeks.    Chuyita Gordon RN  
Name band;

## 2021-01-28 ENCOUNTER — AMBULATORY - HEALTHEAST (OUTPATIENT)
Dept: NURSING | Facility: CLINIC | Age: 66
End: 2021-01-28

## 2021-02-18 ENCOUNTER — AMBULATORY - HEALTHEAST (OUTPATIENT)
Dept: NURSING | Facility: CLINIC | Age: 66
End: 2021-02-18

## 2021-06-20 DIAGNOSIS — I10 ESSENTIAL HYPERTENSION: ICD-10-CM

## 2021-06-22 RX ORDER — LISINOPRIL 5 MG/1
5 TABLET ORAL DAILY
Qty: 90 TABLET | Refills: 0 | Status: SHIPPED | OUTPATIENT
Start: 2021-06-22

## 2021-06-22 NOTE — TELEPHONE ENCOUNTER
LISINOPRIL 5 MG TABLET    Last Written Prescription Date:  5/18/2020  Last Fill Quantity: 90,   # refills: 3  Last Office Visit : 5/18/2020  Future Office visit:  None  90 Tabs, sent to pharm 5/18/2021      Mercy Walker RN  Central Triage Red Flags/Med Refills

## 2021-09-05 ENCOUNTER — HEALTH MAINTENANCE LETTER (OUTPATIENT)
Age: 66
End: 2021-09-05

## 2021-12-26 ENCOUNTER — HEALTH MAINTENANCE LETTER (OUTPATIENT)
Age: 66
End: 2021-12-26

## 2022-10-23 ENCOUNTER — HEALTH MAINTENANCE LETTER (OUTPATIENT)
Age: 67
End: 2022-10-23

## 2023-04-02 ENCOUNTER — HEALTH MAINTENANCE LETTER (OUTPATIENT)
Age: 68
End: 2023-04-02

## 2024-06-02 ENCOUNTER — HEALTH MAINTENANCE LETTER (OUTPATIENT)
Age: 69
End: 2024-06-02

## (undated) RX ORDER — FENTANYL CITRATE 50 UG/ML
INJECTION, SOLUTION INTRAMUSCULAR; INTRAVENOUS
Status: DISPENSED
Start: 2018-08-14

## (undated) RX ORDER — SIMETHICONE 20 MG/.3ML
EMULSION ORAL
Status: DISPENSED
Start: 2018-08-14